# Patient Record
Sex: MALE | Race: WHITE | NOT HISPANIC OR LATINO | Employment: OTHER | URBAN - METROPOLITAN AREA
[De-identification: names, ages, dates, MRNs, and addresses within clinical notes are randomized per-mention and may not be internally consistent; named-entity substitution may affect disease eponyms.]

---

## 2017-01-07 RX ORDER — GABAPENTIN 100 MG/1
CAPSULE ORAL
Qty: 60 CAPSULE | Refills: 2 | Status: SHIPPED | OUTPATIENT
Start: 2017-01-07 | End: 2017-05-09 | Stop reason: SDUPTHER

## 2017-01-30 ENCOUNTER — LAB (OUTPATIENT)
Dept: PULMONOLOGY | Facility: CLINIC | Age: 72
End: 2017-01-30

## 2017-01-30 DIAGNOSIS — J47.9 BRONCHIECTASIS WITHOUT COMPLICATION (HCC): Primary | ICD-10-CM

## 2017-01-30 LAB
ALBUMIN SERPL-MCNC: 4 G/DL (ref 3.2–4.8)
ALBUMIN/GLOB SERPL: 1.2 G/DL (ref 1.5–2.5)
ALP SERPL-CCNC: 84 U/L (ref 25–100)
ALT SERPL W P-5'-P-CCNC: 17 U/L (ref 7–40)
ANION GAP SERPL CALCULATED.3IONS-SCNC: 6 MMOL/L (ref 3–11)
AST SERPL-CCNC: 16 U/L (ref 0–33)
BASOPHILS # BLD AUTO: 0.04 10*3/MM3 (ref 0–0.2)
BASOPHILS NFR BLD AUTO: 0.3 % (ref 0–1)
BILIRUB SERPL-MCNC: 0.4 MG/DL (ref 0.3–1.2)
BUN BLD-MCNC: 18 MG/DL (ref 9–23)
BUN/CREAT SERPL: 22.5 (ref 7–25)
CALCIUM SPEC-SCNC: 9.3 MG/DL (ref 8.7–10.4)
CHLORIDE SERPL-SCNC: 100 MMOL/L (ref 99–109)
CO2 SERPL-SCNC: 30 MMOL/L (ref 20–31)
CREAT BLD-MCNC: 0.8 MG/DL (ref 0.6–1.3)
DEPRECATED RDW RBC AUTO: 43.5 FL (ref 37–54)
EOSINOPHIL # BLD AUTO: 0.17 10*3/MM3 (ref 0.1–0.3)
EOSINOPHIL NFR BLD AUTO: 1.4 % (ref 0–3)
ERYTHROCYTE [DISTWIDTH] IN BLOOD BY AUTOMATED COUNT: 12.9 % (ref 11.3–14.5)
GFR SERPL CREATININE-BSD FRML MDRD: 95 ML/MIN/1.73
GLOBULIN UR ELPH-MCNC: 3.4 GM/DL
GLUCOSE BLD-MCNC: 87 MG/DL (ref 70–100)
HCT VFR BLD AUTO: 46.5 % (ref 38.9–50.9)
HGB BLD-MCNC: 15.5 G/DL (ref 13.1–17.5)
IMM GRANULOCYTES # BLD: 0.06 10*3/MM3 (ref 0–0.03)
IMM GRANULOCYTES NFR BLD: 0.5 % (ref 0–0.6)
LYMPHOCYTES # BLD AUTO: 3.31 10*3/MM3 (ref 0.6–4.8)
LYMPHOCYTES NFR BLD AUTO: 28 % (ref 24–44)
MCH RBC QN AUTO: 30.8 PG (ref 27–31)
MCHC RBC AUTO-ENTMCNC: 33.3 G/DL (ref 32–36)
MCV RBC AUTO: 92.3 FL (ref 80–99)
MONOCYTES # BLD AUTO: 0.9 10*3/MM3 (ref 0–1)
MONOCYTES NFR BLD AUTO: 7.6 % (ref 0–12)
NEUTROPHILS # BLD AUTO: 7.36 10*3/MM3 (ref 1.5–8.3)
NEUTROPHILS NFR BLD AUTO: 62.2 % (ref 41–71)
PLAT MORPH BLD: NORMAL
PLATELET # BLD AUTO: 230 10*3/MM3 (ref 150–450)
PMV BLD AUTO: 10.6 FL (ref 6–12)
POTASSIUM BLD-SCNC: 4.3 MMOL/L (ref 3.5–5.5)
PROT SERPL-MCNC: 7.4 G/DL (ref 5.7–8.2)
RBC # BLD AUTO: 5.04 10*6/MM3 (ref 4.2–5.76)
RBC MORPH BLD: NORMAL
SODIUM BLD-SCNC: 136 MMOL/L (ref 132–146)
WBC MORPH BLD: NORMAL
WBC NRBC COR # BLD: 11.84 10*3/MM3 (ref 3.5–10.8)

## 2017-01-30 PROCEDURE — 36415 COLL VENOUS BLD VENIPUNCTURE: CPT | Performed by: INTERNAL MEDICINE

## 2017-01-30 PROCEDURE — 80053 COMPREHEN METABOLIC PANEL: CPT | Performed by: INTERNAL MEDICINE

## 2017-01-30 PROCEDURE — 85025 COMPLETE CBC W/AUTO DIFF WBC: CPT | Performed by: INTERNAL MEDICINE

## 2017-01-30 PROCEDURE — 85007 BL SMEAR W/DIFF WBC COUNT: CPT | Performed by: INTERNAL MEDICINE

## 2017-02-02 RX ORDER — AZITHROMYCIN 500 MG/1
TABLET, FILM COATED ORAL
Qty: 12 TABLET | Refills: 4 | Status: SHIPPED | OUTPATIENT
Start: 2017-02-02 | End: 2017-06-27 | Stop reason: SDUPTHER

## 2017-02-02 RX ORDER — ETHAMBUTOL HYDROCHLORIDE 400 MG/1
TABLET, FILM COATED ORAL
Qty: 48 TABLET | Refills: 4 | Status: SHIPPED | OUTPATIENT
Start: 2017-02-02 | End: 2017-06-27 | Stop reason: SDUPTHER

## 2017-02-02 RX ORDER — RIFAMPIN 300 MG/1
CAPSULE ORAL
Qty: 24 CAPSULE | Refills: 4 | Status: SHIPPED | OUTPATIENT
Start: 2017-02-02 | End: 2017-06-27 | Stop reason: SDUPTHER

## 2017-02-28 ENCOUNTER — LAB (OUTPATIENT)
Dept: PULMONOLOGY | Facility: CLINIC | Age: 72
End: 2017-02-28

## 2017-02-28 DIAGNOSIS — R06.02 SOB (SHORTNESS OF BREATH): Primary | ICD-10-CM

## 2017-02-28 LAB
ALBUMIN SERPL-MCNC: 4 G/DL (ref 3.2–4.8)
ALBUMIN/GLOB SERPL: 1.3 G/DL (ref 1.5–2.5)
ALP SERPL-CCNC: 74 U/L (ref 25–100)
ALT SERPL W P-5'-P-CCNC: 22 U/L (ref 7–40)
ANION GAP SERPL CALCULATED.3IONS-SCNC: 3 MMOL/L (ref 3–11)
AST SERPL-CCNC: 20 U/L (ref 0–33)
BASOPHILS # BLD AUTO: 0.04 10*3/MM3 (ref 0–0.2)
BASOPHILS NFR BLD AUTO: 0.5 % (ref 0–1)
BILIRUB SERPL-MCNC: 0.4 MG/DL (ref 0.3–1.2)
BUN BLD-MCNC: 16 MG/DL (ref 9–23)
BUN/CREAT SERPL: 20 (ref 7–25)
CALCIUM SPEC-SCNC: 9.4 MG/DL (ref 8.7–10.4)
CHLORIDE SERPL-SCNC: 101 MMOL/L (ref 99–109)
CO2 SERPL-SCNC: 33 MMOL/L (ref 20–31)
CREAT BLD-MCNC: 0.8 MG/DL (ref 0.6–1.3)
DEPRECATED RDW RBC AUTO: 45.2 FL (ref 37–54)
EOSINOPHIL # BLD AUTO: 0.11 10*3/MM3 (ref 0.1–0.3)
EOSINOPHIL NFR BLD AUTO: 1.3 % (ref 0–3)
ERYTHROCYTE [DISTWIDTH] IN BLOOD BY AUTOMATED COUNT: 13.3 % (ref 11.3–14.5)
GFR SERPL CREATININE-BSD FRML MDRD: 95 ML/MIN/1.73
GLOBULIN UR ELPH-MCNC: 3 GM/DL
GLUCOSE BLD-MCNC: 85 MG/DL (ref 70–100)
HCT VFR BLD AUTO: 46.1 % (ref 38.9–50.9)
HGB BLD-MCNC: 15.2 G/DL (ref 13.1–17.5)
IMM GRANULOCYTES # BLD: 0.03 10*3/MM3 (ref 0–0.03)
IMM GRANULOCYTES NFR BLD: 0.4 % (ref 0–0.6)
LYMPHOCYTES # BLD AUTO: 2.85 10*3/MM3 (ref 0.6–4.8)
LYMPHOCYTES NFR BLD AUTO: 34 % (ref 24–44)
MCH RBC QN AUTO: 30.6 PG (ref 27–31)
MCHC RBC AUTO-ENTMCNC: 33 G/DL (ref 32–36)
MCV RBC AUTO: 92.8 FL (ref 80–99)
MONOCYTES # BLD AUTO: 0.76 10*3/MM3 (ref 0–1)
MONOCYTES NFR BLD AUTO: 9.1 % (ref 0–12)
NEUTROPHILS # BLD AUTO: 4.59 10*3/MM3 (ref 1.5–8.3)
NEUTROPHILS NFR BLD AUTO: 54.7 % (ref 41–71)
PLATELET # BLD AUTO: 304 10*3/MM3 (ref 150–450)
PMV BLD AUTO: 9.7 FL (ref 6–12)
POTASSIUM BLD-SCNC: 5 MMOL/L (ref 3.5–5.5)
PROT SERPL-MCNC: 7 G/DL (ref 5.7–8.2)
RBC # BLD AUTO: 4.97 10*6/MM3 (ref 4.2–5.76)
SODIUM BLD-SCNC: 137 MMOL/L (ref 132–146)
WBC NRBC COR # BLD: 8.38 10*3/MM3 (ref 3.5–10.8)

## 2017-02-28 PROCEDURE — 85025 COMPLETE CBC W/AUTO DIFF WBC: CPT | Performed by: INTERNAL MEDICINE

## 2017-02-28 PROCEDURE — 36415 COLL VENOUS BLD VENIPUNCTURE: CPT | Performed by: INTERNAL MEDICINE

## 2017-02-28 PROCEDURE — 80053 COMPREHEN METABOLIC PANEL: CPT | Performed by: INTERNAL MEDICINE

## 2017-03-28 ENCOUNTER — LAB (OUTPATIENT)
Dept: PULMONOLOGY | Facility: CLINIC | Age: 72
End: 2017-03-28

## 2017-03-28 DIAGNOSIS — R06.02 SOB (SHORTNESS OF BREATH): Primary | ICD-10-CM

## 2017-03-28 LAB
ALBUMIN SERPL-MCNC: 3.8 G/DL (ref 3.2–4.8)
ALBUMIN/GLOB SERPL: 1.2 G/DL (ref 1.5–2.5)
ALP SERPL-CCNC: 78 U/L (ref 25–100)
ALT SERPL W P-5'-P-CCNC: 21 U/L (ref 7–40)
ANION GAP SERPL CALCULATED.3IONS-SCNC: 4 MMOL/L (ref 3–11)
AST SERPL-CCNC: 19 U/L (ref 0–33)
BASOPHILS # BLD AUTO: 0.05 10*3/MM3 (ref 0–0.2)
BASOPHILS NFR BLD AUTO: 0.4 % (ref 0–1)
BILIRUB SERPL-MCNC: 0.4 MG/DL (ref 0.3–1.2)
BUN BLD-MCNC: 16 MG/DL (ref 9–23)
BUN/CREAT SERPL: 20 (ref 7–25)
CALCIUM SPEC-SCNC: 9.1 MG/DL (ref 8.7–10.4)
CHLORIDE SERPL-SCNC: 107 MMOL/L (ref 99–109)
CO2 SERPL-SCNC: 31 MMOL/L (ref 20–31)
CREAT BLD-MCNC: 0.8 MG/DL (ref 0.6–1.3)
DEPRECATED RDW RBC AUTO: 46 FL (ref 37–54)
EOSINOPHIL # BLD AUTO: 0.18 10*3/MM3 (ref 0.1–0.3)
EOSINOPHIL NFR BLD AUTO: 1.5 % (ref 0–3)
ERYTHROCYTE [DISTWIDTH] IN BLOOD BY AUTOMATED COUNT: 13.4 % (ref 11.3–14.5)
GFR SERPL CREATININE-BSD FRML MDRD: 95 ML/MIN/1.73
GLOBULIN UR ELPH-MCNC: 3.1 GM/DL
GLUCOSE BLD-MCNC: 86 MG/DL (ref 70–100)
HCT VFR BLD AUTO: 45.4 % (ref 38.9–50.9)
HGB BLD-MCNC: 15.1 G/DL (ref 13.1–17.5)
IMM GRANULOCYTES # BLD: 0.04 10*3/MM3 (ref 0–0.03)
IMM GRANULOCYTES NFR BLD: 0.3 % (ref 0–0.6)
LYMPHOCYTES # BLD AUTO: 3.05 10*3/MM3 (ref 0.6–4.8)
LYMPHOCYTES NFR BLD AUTO: 25.1 % (ref 24–44)
MCH RBC QN AUTO: 31.1 PG (ref 27–31)
MCHC RBC AUTO-ENTMCNC: 33.3 G/DL (ref 32–36)
MCV RBC AUTO: 93.6 FL (ref 80–99)
MONOCYTES # BLD AUTO: 1.03 10*3/MM3 (ref 0–1)
MONOCYTES NFR BLD AUTO: 8.5 % (ref 0–12)
NEUTROPHILS # BLD AUTO: 7.81 10*3/MM3 (ref 1.5–8.3)
NEUTROPHILS NFR BLD AUTO: 64.2 % (ref 41–71)
PLATELET # BLD AUTO: 277 10*3/MM3 (ref 150–450)
PMV BLD AUTO: 9.6 FL (ref 6–12)
POTASSIUM BLD-SCNC: 4.6 MMOL/L (ref 3.5–5.5)
PROT SERPL-MCNC: 6.9 G/DL (ref 5.7–8.2)
RBC # BLD AUTO: 4.85 10*6/MM3 (ref 4.2–5.76)
SODIUM BLD-SCNC: 142 MMOL/L (ref 132–146)
WBC NRBC COR # BLD: 12.16 10*3/MM3 (ref 3.5–10.8)

## 2017-03-28 PROCEDURE — 36415 COLL VENOUS BLD VENIPUNCTURE: CPT | Performed by: INTERNAL MEDICINE

## 2017-03-28 PROCEDURE — 80053 COMPREHEN METABOLIC PANEL: CPT | Performed by: INTERNAL MEDICINE

## 2017-03-28 PROCEDURE — 85025 COMPLETE CBC W/AUTO DIFF WBC: CPT | Performed by: INTERNAL MEDICINE

## 2017-04-25 ENCOUNTER — OFFICE VISIT (OUTPATIENT)
Dept: PULMONOLOGY | Facility: CLINIC | Age: 72
End: 2017-04-25

## 2017-04-25 VITALS
WEIGHT: 167 LBS | RESPIRATION RATE: 16 BRPM | DIASTOLIC BLOOD PRESSURE: 68 MMHG | HEART RATE: 68 BPM | HEIGHT: 70 IN | OXYGEN SATURATION: 90 % | SYSTOLIC BLOOD PRESSURE: 120 MMHG | TEMPERATURE: 96.9 F | BODY MASS INDEX: 23.91 KG/M2

## 2017-04-25 DIAGNOSIS — A31.0 MYCOBACTERIUM AVIUM COMPLEX (HCC): Primary | ICD-10-CM

## 2017-04-25 DIAGNOSIS — J47.9 BRONCHIECTASIS WITHOUT COMPLICATION (HCC): Chronic | ICD-10-CM

## 2017-04-25 DIAGNOSIS — K21.9 GASTROESOPHAGEAL REFLUX DISEASE WITHOUT ESOPHAGITIS: Chronic | ICD-10-CM

## 2017-04-25 DIAGNOSIS — J43.2 CENTRILOBULAR EMPHYSEMA (HCC): ICD-10-CM

## 2017-04-25 DIAGNOSIS — R06.02 SOB (SHORTNESS OF BREATH): ICD-10-CM

## 2017-04-25 LAB
ALBUMIN SERPL-MCNC: 3.8 G/DL (ref 3.2–4.8)
ALBUMIN/GLOB SERPL: 1.2 G/DL (ref 1.5–2.5)
ALP SERPL-CCNC: 74 U/L (ref 25–100)
ALT SERPL W P-5'-P-CCNC: 22 U/L (ref 7–40)
ANION GAP SERPL CALCULATED.3IONS-SCNC: 5 MMOL/L (ref 3–11)
AST SERPL-CCNC: 27 U/L (ref 0–33)
BASOPHILS # BLD AUTO: 0.04 10*3/MM3 (ref 0–0.2)
BASOPHILS NFR BLD AUTO: 0.3 % (ref 0–1)
BILIRUB SERPL-MCNC: 0.2 MG/DL (ref 0.3–1.2)
BUN BLD-MCNC: 20 MG/DL (ref 9–23)
BUN/CREAT SERPL: 25 (ref 7–25)
CALCIUM SPEC-SCNC: 9.2 MG/DL (ref 8.7–10.4)
CHLORIDE SERPL-SCNC: 104 MMOL/L (ref 99–109)
CO2 SERPL-SCNC: 31 MMOL/L (ref 20–31)
CREAT BLD-MCNC: 0.8 MG/DL (ref 0.6–1.3)
DEPRECATED RDW RBC AUTO: 43.9 FL (ref 37–54)
EOSINOPHIL # BLD AUTO: 0.11 10*3/MM3 (ref 0.1–0.3)
EOSINOPHIL NFR BLD AUTO: 0.9 % (ref 0–3)
ERYTHROCYTE [DISTWIDTH] IN BLOOD BY AUTOMATED COUNT: 13.1 % (ref 11.3–14.5)
GFR SERPL CREATININE-BSD FRML MDRD: 95 ML/MIN/1.73
GLOBULIN UR ELPH-MCNC: 3.1 GM/DL
GLUCOSE BLD-MCNC: 126 MG/DL (ref 70–100)
HCT VFR BLD AUTO: 43.4 % (ref 38.9–50.9)
HGB BLD-MCNC: 14.4 G/DL (ref 13.1–17.5)
IMM GRANULOCYTES # BLD: 0.02 10*3/MM3 (ref 0–0.03)
IMM GRANULOCYTES NFR BLD: 0.2 % (ref 0–0.6)
LYMPHOCYTES # BLD AUTO: 2.46 10*3/MM3 (ref 0.6–4.8)
LYMPHOCYTES NFR BLD AUTO: 21.1 % (ref 24–44)
MCH RBC QN AUTO: 30.4 PG (ref 27–31)
MCHC RBC AUTO-ENTMCNC: 33.2 G/DL (ref 32–36)
MCV RBC AUTO: 91.6 FL (ref 80–99)
MONOCYTES # BLD AUTO: 0.7 10*3/MM3 (ref 0–1)
MONOCYTES NFR BLD AUTO: 6 % (ref 0–12)
NEUTROPHILS # BLD AUTO: 8.31 10*3/MM3 (ref 1.5–8.3)
NEUTROPHILS NFR BLD AUTO: 71.5 % (ref 41–71)
PLATELET # BLD AUTO: 292 10*3/MM3 (ref 150–450)
PMV BLD AUTO: 10 FL (ref 6–12)
POTASSIUM BLD-SCNC: 3.8 MMOL/L (ref 3.5–5.5)
PROT SERPL-MCNC: 6.9 G/DL (ref 5.7–8.2)
RBC # BLD AUTO: 4.74 10*6/MM3 (ref 4.2–5.76)
SODIUM BLD-SCNC: 140 MMOL/L (ref 132–146)
WBC NRBC COR # BLD: 11.64 10*3/MM3 (ref 3.5–10.8)

## 2017-04-25 PROCEDURE — 36415 COLL VENOUS BLD VENIPUNCTURE: CPT | Performed by: INTERNAL MEDICINE

## 2017-04-25 PROCEDURE — 99213 OFFICE O/P EST LOW 20 MIN: CPT | Performed by: INTERNAL MEDICINE

## 2017-04-25 PROCEDURE — 85025 COMPLETE CBC W/AUTO DIFF WBC: CPT | Performed by: INTERNAL MEDICINE

## 2017-04-25 PROCEDURE — 80053 COMPREHEN METABOLIC PANEL: CPT | Performed by: INTERNAL MEDICINE

## 2017-04-25 NOTE — PATIENT INSTRUCTIONS
You appear be doing well today and have 5 more months of therapy for MAC  Plan:  1. I will see you in 4 months and we will obtain a CT scan of the chest at that time  2. Continue to get labs drawn monthly  3. We need to remember to get a flu vaccine this fall  4. If once a day Breo is as effective as Advair call us for a prescription. I gave you enough for 4 weeks

## 2017-04-25 NOTE — PROGRESS NOTES
Subjective   Dave Hinds is a 71 y.o.  white male whom I followed for quite some time. The patient was last seen in the office 12/8/16 at which time I outlined his problems as follows:   1. Bronchiectasis.  a. Worse in the upper lobes, left greater than right.  b. Previous growth of staph aureus and 2 species of pseudomonas.   c. MAC growth 01/21/2014 not initially treated, but therapy started when chest x-ray worsened and MAC cultured out again 02/25/2016.  d.Therapy started 3/2016 with Zithromax, ethambutol, rifampin on Monday, Wednesday, Friday.   e. Had previously been treated with rotating courses of antibiotics Augmentin and Cipro the first week of each month as well as inhaled ellis, which was very effective when grew PSA.   2. GERD.  a. History of dyspepsia.   3. History of hypertension.   4. Remote history of pneumonia and “lung cavities”.   5. History of T&A.  6. PENICILLIN allergy and yet can take Augmentin.      History of Present Illness   Since our last visit 12/8/16 (4-1/2 months ago) Mr. Hinds has had no major problems. He continues to take his therapy religiously and has not had any major infections requiring additional antibiotics. 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 He has not however had grossly purulent sputum, hemoptysis, pleuritic pain, fever, chills, sweats, anorexia, weight loss. He continues to take his MAC therapy 3 times a week and is having no side effects. Labs are obtained once a month and when last obtained 3/28/17, labs were unremarkable. He continues to use Advair but states that he will need to switch to Breo because of insurance requirements. He has both an albuterol nebulizer and albuterol metered-dose inhaler to take as needed.     The following portions of the patient's history were reviewed and updated as appropriate: allergies, current medications, past family  history, past medical history, past social history, past surgical history and problem list.    Review of Systems   Constitutional: Negative for appetite change, chills, diaphoresis, fatigue, fever and unexpected weight change.   HENT: Negative for congestion, ear discharge, ear pain, hearing loss, postnasal drip, rhinorrhea, sinus pressure, sneezing, sore throat, trouble swallowing and voice change.    Eyes: Negative for visual disturbance.        No diplopia   Respiratory: Positive for cough (Intermittent but usually productive ). Negative for choking, chest tightness, shortness of breath, wheezing and stridor.    Cardiovascular: Negative for chest pain, palpitations and leg swelling.   Gastrointestinal: Negative for abdominal pain, anal bleeding, blood in stool, constipation, diarrhea, nausea and vomiting.   Endocrine: Negative for cold intolerance, heat intolerance, polydipsia and polyuria.   Genitourinary: Negative for decreased urine volume, difficulty urinating, dysuria, frequency, hematuria and urgency.   Musculoskeletal: Negative for arthralgias, back pain, joint swelling and myalgias.   Skin: Negative for pallor and rash.   Allergic/Immunologic: Negative for environmental allergies, food allergies and immunocompromised state.   Neurological: Positive for dizziness (still occurs when he stands up quickly and turns his head). Negative for seizures, syncope, speech difficulty, weakness and headaches.   Hematological: Negative for adenopathy. Does not bruise/bleed easily.   Psychiatric/Behavioral: Negative for confusion, decreased concentration and sleep disturbance. The patient is not nervous/anxious.    All other systems reviewed and are negative.      Prior to Admission medications    Medication Sig Start Date End Date Taking? Authorizing Provider   albuterol (PROVENTIL HFA;VENTOLIN HFA) 108 (90 BASE) MCG/ACT inhaler ProAir  (90 Base) MCG/ACT Inhalation Aerosol Solution; Patient Sig: ProAir   "(90 Base) MCG/ACT Inhalation Aerosol Solution INHALE 2 PUFFS FOUR TIMES A DAY FOR COUGH; 1; 11; 02-Jun-2015; Active 6/2/15  Yes Dave Peter MD   albuterol (PROVENTIL) (2.5 MG/3ML) 0.083% nebulizer solution Take 2.5 mg by nebulization every 4 (four) hours as needed for wheezing. 1/25/16  Yes Antonio Provider, MD   azithromycin (ZITHROMAX) 500 MG tablet TAKE ONE TABLET BY MOUTH DAILY ON MONDAY, WEDNESDAY AND FRIDAY 2/2/17  Yes Dave Peter MD   diltiazem (TIAZAC) 300 MG 24 hr capsule Take 1 capsule by mouth every morning. 2/29/16  Yes Sachin Garcia MD   ethambutol (MYAMBUTOL) 400 MG tablet TAKE 4 TABLETS BY MOUTH EVERY MONDAY, WEDNESDAY, AND  FRIDAY. 2/2/17  Yes Dave Peter MD   fluticasone-salmeterol (ADVAIR) 250-50 MCG/DOSE DISKUS 2 (two) times a day. Inhale 1 puff by mouth twice daily. 1/21/14  Yes aDve Peter MD   gabapentin (NEURONTIN) 100 MG capsule TAKE 1 TO 2 CAPSULES BY MOUTH AT BEDTIME  Patient taking differently: 2 CAPSULES BY MOUTH AT BEDTIME 1/7/17  Yes Sachin Garcia MD   hydrochlorothiazide (MICROZIDE) 12.5 MG capsule Take 1 capsule by mouth Every Morning. 10/10/16  Yes Sachin Garcia MD   omeprazole (PriLOSEC) 40 MG capsule TAKE ONE CAPSULE BY MOUTH EVERY DAY 7/21/16  Yes Dave Peter MD   rifAMPin (RIFADIN) 300 MG capsule TAKE 2 CAPSULES BY MOUTH EVERY MONDAY, WEDNESDAY, AND  FRIDAY. 2/2/17  Yes Dave Peter MD       Objective   Blood pressure 120/68, pulse 68, temperature 96.9 °F (36.1 °C), resp. rate 16, height 70\" (177.8 cm), weight 167 lb (75.8 kg), SpO2 90 %.    Flowsheet Rows         First Filed Value    Admission Height  70\" (177.8 cm) Documented at 04/25/2017 1304    Admission Weight  167 lb (75.8 kg) Documented at 04/25/2017 1304        Physical Exam   Constitutional: He is oriented to person, place, and time. He appears well-developed and well-nourished.   Well-developed thin white male in no acute distress   HENT:   Head: " Normocephalic and atraumatic.   Right Ear: Hearing and external ear normal.   Left Ear: Hearing and external ear normal.   Nose: Nose normal.   Mouth/Throat: Oropharynx is clear and moist. No oropharyngeal exudate.   Eyes: Conjunctivae and EOM are normal. Pupils are equal, round, and reactive to light. Right eye exhibits no discharge. Left eye exhibits no discharge. No scleral icterus.   Neck: Normal range of motion. Neck supple. No JVD present. No tracheal deviation present. No thyromegaly present.   Cardiovascular: Normal rate, regular rhythm, normal heart sounds and intact distal pulses.  Exam reveals no gallop and no friction rub.    No murmur heard.  Pulmonary/Chest: Effort normal. No accessory muscle usage or stridor. No apnea, no tachypnea and no bradypnea. No respiratory distress. He has no wheezes. He has no rhonchi. He has rales. He exhibits no tenderness.   A few fibrotic crackles in the bases as well as the left upper lobe anteriorly.   Abdominal: Soft. Bowel sounds are normal. He exhibits no distension and no mass. There is no splenomegaly or hepatomegaly. There is no tenderness. There is no rebound and no guarding. No hernia.   Musculoskeletal: Normal range of motion. He exhibits no edema, tenderness or deformity.   Lymphadenopathy:        Head (right side): No submandibular adenopathy present.        Head (left side): No submandibular adenopathy present.     He has no cervical adenopathy.        Right: No supraclavicular and no epitrochlear adenopathy present.        Left: No supraclavicular and no epitrochlear adenopathy present.   Neurological: He is alert and oriented to person, place, and time. He has normal reflexes. He displays normal reflexes. No cranial nerve deficit. He exhibits normal muscle tone. Coordination normal.   Skin: Skin is warm and dry. No bruising, no ecchymosis, no petechiae and no rash noted. He is not diaphoretic. No cyanosis or erythema. No pallor. Nails show no clubbing.    Psychiatric: He has a normal mood and affect. His speech is normal and behavior is normal. Judgment and thought content normal.   Nursing note and vitals reviewed.      Assessment/Plan   1. Bronchiectasis.  a. Worse in the upper lobes, left greater than right.  b. Previous growth of staph aureus and 2 species of pseudomonas.   c. MAC growth 01/21/2014 not initially treated, but therapy started when chest x-ray worsened and MAC cultured out again 02/25/2016.  d.Therapy started 3/2016 with Zithromax, ethambutol, rifampin on Monday, Wednesday, Friday.   e. Had previously been treated with rotating courses of antibiotics Augmentin and Cipro the first week of each month as well as inhaled ellis, which was very effective when grew PSA.   2. GERD.  a. History of dyspepsia.   3. History of hypertension.   4. Remote history of pneumonia and “lung cavities”.   5. History of T&A.  6. PENICILLIN allergy and yet can take Augmentin.      Discussion: Does not appear to be having any new problems, is tolerating MAC therapy without any significant abnormalities in the follow-up labs, and remains very active without difficulty.    Plan:   1. He will complete MAC therapy in 5 months  2. Continue monthly labs  3. Needs a flu vaccine this fall  4. I have given him one month of samples of Breo. If he feels it is as effective as Advair he will call us for prescription. If he feels it is ineffective or he has any side effects, I will write a letter to his insurance company so that he continue to get Advair  5. I will see him in 4 months and at that time we will obtain PFTs and a CT scan of the chest. (The latter as a baseline prior to stopping MAC therapy)  6. After he stops therapy, if he has symptoms suggestive of recurrence would recommend bronchoscopy and lavage.    Dave Peter MD  Pulmonary and Critical Care Medicine  04/25/17 4:00 PM

## 2017-05-10 RX ORDER — GABAPENTIN 100 MG/1
CAPSULE ORAL
Qty: 60 CAPSULE | Refills: 1 | Status: SHIPPED | OUTPATIENT
Start: 2017-05-10 | End: 2017-08-18 | Stop reason: SDUPTHER

## 2017-05-25 DIAGNOSIS — A31.0 MYCOBACTERIUM AVIUM COMPLEX (HCC): ICD-10-CM

## 2017-05-25 DIAGNOSIS — J47.9 BRONCHIECTASIS WITHOUT COMPLICATION (HCC): Primary | ICD-10-CM

## 2017-05-25 LAB
ALBUMIN SERPL-MCNC: 3.8 G/DL (ref 3.2–4.8)
ALBUMIN/GLOB SERPL: 1.3 G/DL (ref 1.5–2.5)
ALP SERPL-CCNC: 81 U/L (ref 25–100)
ALT SERPL W P-5'-P-CCNC: 19 U/L (ref 7–40)
ANION GAP SERPL CALCULATED.3IONS-SCNC: 6 MMOL/L (ref 3–11)
AST SERPL-CCNC: 16 U/L (ref 0–33)
BASOPHILS # BLD AUTO: 0.03 10*3/MM3 (ref 0–0.2)
BASOPHILS NFR BLD AUTO: 0.3 % (ref 0–1)
BILIRUB SERPL-MCNC: 0.4 MG/DL (ref 0.3–1.2)
BUN BLD-MCNC: 18 MG/DL (ref 9–23)
BUN/CREAT SERPL: 22.5 (ref 7–25)
CALCIUM SPEC-SCNC: 9.2 MG/DL (ref 8.7–10.4)
CHLORIDE SERPL-SCNC: 104 MMOL/L (ref 99–109)
CO2 SERPL-SCNC: 29 MMOL/L (ref 20–31)
CREAT BLD-MCNC: 0.8 MG/DL (ref 0.6–1.3)
DEPRECATED RDW RBC AUTO: 45.1 FL (ref 37–54)
EOSINOPHIL # BLD AUTO: 0.09 10*3/MM3 (ref 0.1–0.3)
EOSINOPHIL NFR BLD AUTO: 0.8 % (ref 0–3)
ERYTHROCYTE [DISTWIDTH] IN BLOOD BY AUTOMATED COUNT: 13.2 % (ref 11.3–14.5)
GFR SERPL CREATININE-BSD FRML MDRD: 95 ML/MIN/1.73
GLOBULIN UR ELPH-MCNC: 3 GM/DL
GLUCOSE BLD-MCNC: 170 MG/DL (ref 70–100)
HCT VFR BLD AUTO: 45.4 % (ref 38.9–50.9)
HGB BLD-MCNC: 14.7 G/DL (ref 13.1–17.5)
IMM GRANULOCYTES # BLD: 0.03 10*3/MM3 (ref 0–0.03)
IMM GRANULOCYTES NFR BLD: 0.3 % (ref 0–0.6)
LYMPHOCYTES # BLD AUTO: 2.16 10*3/MM3 (ref 0.6–4.8)
LYMPHOCYTES NFR BLD AUTO: 19.1 % (ref 24–44)
MCH RBC QN AUTO: 30.2 PG (ref 27–31)
MCHC RBC AUTO-ENTMCNC: 32.4 G/DL (ref 32–36)
MCV RBC AUTO: 93.4 FL (ref 80–99)
MONOCYTES # BLD AUTO: 0.69 10*3/MM3 (ref 0–1)
MONOCYTES NFR BLD AUTO: 6.1 % (ref 0–12)
NEUTROPHILS # BLD AUTO: 8.28 10*3/MM3 (ref 1.5–8.3)
NEUTROPHILS NFR BLD AUTO: 73.4 % (ref 41–71)
PLATELET # BLD AUTO: 295 10*3/MM3 (ref 150–450)
PMV BLD AUTO: 9.8 FL (ref 6–12)
POTASSIUM BLD-SCNC: 4.3 MMOL/L (ref 3.5–5.5)
PROT SERPL-MCNC: 6.8 G/DL (ref 5.7–8.2)
RBC # BLD AUTO: 4.86 10*6/MM3 (ref 4.2–5.76)
SODIUM BLD-SCNC: 139 MMOL/L (ref 132–146)
WBC NRBC COR # BLD: 11.28 10*3/MM3 (ref 3.5–10.8)

## 2017-05-25 PROCEDURE — 85025 COMPLETE CBC W/AUTO DIFF WBC: CPT | Performed by: INTERNAL MEDICINE

## 2017-05-25 PROCEDURE — 80053 COMPREHEN METABOLIC PANEL: CPT | Performed by: INTERNAL MEDICINE

## 2017-06-06 DIAGNOSIS — I10 ESSENTIAL HYPERTENSION: ICD-10-CM

## 2017-06-06 RX ORDER — HYDROCHLOROTHIAZIDE 12.5 MG/1
CAPSULE, GELATIN COATED ORAL
Qty: 90 CAPSULE | Refills: 1 | Status: SHIPPED | OUTPATIENT
Start: 2017-06-06 | End: 2018-01-06 | Stop reason: SDUPTHER

## 2017-06-22 ENCOUNTER — LAB (OUTPATIENT)
Dept: PULMONOLOGY | Facility: CLINIC | Age: 72
End: 2017-06-22

## 2017-06-22 DIAGNOSIS — J47.9 BRONCHIECTASIS WITHOUT COMPLICATION (HCC): Primary | ICD-10-CM

## 2017-06-22 LAB
ALBUMIN SERPL-MCNC: 3.9 G/DL (ref 3.2–4.8)
ALBUMIN/GLOB SERPL: 1.2 G/DL (ref 1.5–2.5)
ALP SERPL-CCNC: 89 U/L (ref 25–100)
ALT SERPL W P-5'-P-CCNC: 19 U/L (ref 7–40)
ANION GAP SERPL CALCULATED.3IONS-SCNC: 10 MMOL/L (ref 3–11)
AST SERPL-CCNC: 21 U/L (ref 0–33)
BASOPHILS # BLD AUTO: 0.06 10*3/MM3 (ref 0–0.2)
BASOPHILS NFR BLD AUTO: 0.6 % (ref 0–1)
BILIRUB SERPL-MCNC: 0.4 MG/DL (ref 0.3–1.2)
BUN BLD-MCNC: 20 MG/DL (ref 9–23)
BUN/CREAT SERPL: 22.2 (ref 7–25)
CALCIUM SPEC-SCNC: 9.3 MG/DL (ref 8.7–10.4)
CHLORIDE SERPL-SCNC: 104 MMOL/L (ref 99–109)
CO2 SERPL-SCNC: 27 MMOL/L (ref 20–31)
CREAT BLD-MCNC: 0.9 MG/DL (ref 0.6–1.3)
DEPRECATED RDW RBC AUTO: 44.5 FL (ref 37–54)
EOSINOPHIL # BLD AUTO: 0.42 10*3/MM3 (ref 0.1–0.3)
EOSINOPHIL NFR BLD AUTO: 4.4 % (ref 0–3)
ERYTHROCYTE [DISTWIDTH] IN BLOOD BY AUTOMATED COUNT: 13.1 % (ref 11.3–14.5)
GFR SERPL CREATININE-BSD FRML MDRD: 83 ML/MIN/1.73
GLOBULIN UR ELPH-MCNC: 3.3 GM/DL
GLUCOSE BLD-MCNC: 145 MG/DL (ref 70–100)
HCT VFR BLD AUTO: 43.4 % (ref 38.9–50.9)
HGB BLD-MCNC: 14.1 G/DL (ref 13.1–17.5)
IMM GRANULOCYTES # BLD: 0.03 10*3/MM3 (ref 0–0.03)
IMM GRANULOCYTES NFR BLD: 0.3 % (ref 0–0.6)
LYMPHOCYTES # BLD AUTO: 2.35 10*3/MM3 (ref 0.6–4.8)
LYMPHOCYTES NFR BLD AUTO: 24.8 % (ref 24–44)
MCH RBC QN AUTO: 30.2 PG (ref 27–31)
MCHC RBC AUTO-ENTMCNC: 32.5 G/DL (ref 32–36)
MCV RBC AUTO: 92.9 FL (ref 80–99)
MONOCYTES # BLD AUTO: 0.7 10*3/MM3 (ref 0–1)
MONOCYTES NFR BLD AUTO: 7.4 % (ref 0–12)
NEUTROPHILS # BLD AUTO: 5.93 10*3/MM3 (ref 1.5–8.3)
NEUTROPHILS NFR BLD AUTO: 62.5 % (ref 41–71)
PLATELET # BLD AUTO: 320 10*3/MM3 (ref 150–450)
PMV BLD AUTO: 9.8 FL (ref 6–12)
POTASSIUM BLD-SCNC: 4.4 MMOL/L (ref 3.5–5.5)
PROT SERPL-MCNC: 7.2 G/DL (ref 5.7–8.2)
RBC # BLD AUTO: 4.67 10*6/MM3 (ref 4.2–5.76)
SODIUM BLD-SCNC: 141 MMOL/L (ref 132–146)
WBC NRBC COR # BLD: 9.49 10*3/MM3 (ref 3.5–10.8)

## 2017-06-22 PROCEDURE — 80053 COMPREHEN METABOLIC PANEL: CPT | Performed by: INTERNAL MEDICINE

## 2017-06-22 PROCEDURE — 36415 COLL VENOUS BLD VENIPUNCTURE: CPT | Performed by: INTERNAL MEDICINE

## 2017-06-22 PROCEDURE — 85025 COMPLETE CBC W/AUTO DIFF WBC: CPT | Performed by: INTERNAL MEDICINE

## 2017-06-22 RX ORDER — OMEPRAZOLE 40 MG/1
40 CAPSULE, DELAYED RELEASE ORAL DAILY
Qty: 30 CAPSULE | Refills: 10 | Status: SHIPPED | OUTPATIENT
Start: 2017-06-22 | End: 2018-07-17 | Stop reason: SDUPTHER

## 2017-06-26 RX ORDER — DILTIAZEM HYDROCHLORIDE 300 MG/1
CAPSULE, COATED, EXTENDED RELEASE ORAL
Qty: 90 CAPSULE | Refills: 1 | OUTPATIENT
Start: 2017-06-26

## 2017-06-27 RX ORDER — RIFAMPIN 300 MG/1
CAPSULE ORAL
Qty: 24 CAPSULE | Refills: 3 | Status: SHIPPED | OUTPATIENT
Start: 2017-06-27 | End: 2017-10-06 | Stop reason: ALTCHOICE

## 2017-06-27 RX ORDER — ETHAMBUTOL HYDROCHLORIDE 400 MG/1
TABLET, FILM COATED ORAL
Qty: 48 TABLET | Refills: 3 | Status: SHIPPED | OUTPATIENT
Start: 2017-06-27 | End: 2017-10-06 | Stop reason: ALTCHOICE

## 2017-06-27 RX ORDER — AZITHROMYCIN 500 MG/1
TABLET, FILM COATED ORAL
Qty: 12 TABLET | Refills: 3 | Status: SHIPPED | OUTPATIENT
Start: 2017-06-27 | End: 2017-11-09 | Stop reason: SDUPTHER

## 2017-07-03 RX ORDER — GABAPENTIN 100 MG/1
CAPSULE ORAL
Qty: 60 CAPSULE | Refills: 0 | OUTPATIENT
Start: 2017-07-03

## 2017-07-24 RX ORDER — DILTIAZEM HYDROCHLORIDE 300 MG/1
CAPSULE, COATED, EXTENDED RELEASE ORAL
Qty: 90 CAPSULE | Refills: 1 | Status: SHIPPED | OUTPATIENT
Start: 2017-07-24 | End: 2017-08-18

## 2017-07-25 ENCOUNTER — LAB (OUTPATIENT)
Dept: PULMONOLOGY | Facility: CLINIC | Age: 72
End: 2017-07-25

## 2017-07-25 DIAGNOSIS — R06.02 SOB (SHORTNESS OF BREATH): Primary | ICD-10-CM

## 2017-07-25 LAB
ALBUMIN SERPL-MCNC: 3.9 G/DL (ref 3.2–4.8)
ALBUMIN/GLOB SERPL: 1.2 G/DL (ref 1.5–2.5)
ALP SERPL-CCNC: 94 U/L (ref 25–100)
ALT SERPL W P-5'-P-CCNC: 18 U/L (ref 7–40)
ANION GAP SERPL CALCULATED.3IONS-SCNC: 8 MMOL/L (ref 3–11)
AST SERPL-CCNC: 17 U/L (ref 0–33)
BASOPHILS # BLD AUTO: 0.05 10*3/MM3 (ref 0–0.2)
BASOPHILS NFR BLD AUTO: 0.4 % (ref 0–1)
BILIRUB SERPL-MCNC: 0.4 MG/DL (ref 0.3–1.2)
BUN BLD-MCNC: 17 MG/DL (ref 9–23)
BUN/CREAT SERPL: 21.3 (ref 7–25)
CALCIUM SPEC-SCNC: 9.1 MG/DL (ref 8.7–10.4)
CHLORIDE SERPL-SCNC: 101 MMOL/L (ref 99–109)
CO2 SERPL-SCNC: 28 MMOL/L (ref 20–31)
CREAT BLD-MCNC: 0.8 MG/DL (ref 0.6–1.3)
DEPRECATED RDW RBC AUTO: 44.9 FL (ref 37–54)
EOSINOPHIL # BLD AUTO: 0.59 10*3/MM3 (ref 0–0.3)
EOSINOPHIL NFR BLD AUTO: 4.7 % (ref 0–3)
ERYTHROCYTE [DISTWIDTH] IN BLOOD BY AUTOMATED COUNT: 13 % (ref 11.3–14.5)
GFR SERPL CREATININE-BSD FRML MDRD: 95 ML/MIN/1.73
GLOBULIN UR ELPH-MCNC: 3.3 GM/DL
GLUCOSE BLD-MCNC: 119 MG/DL (ref 70–100)
HCT VFR BLD AUTO: 45.5 % (ref 38.9–50.9)
HGB BLD-MCNC: 14.7 G/DL (ref 13.1–17.5)
IMM GRANULOCYTES # BLD: 0.03 10*3/MM3 (ref 0–0.03)
IMM GRANULOCYTES NFR BLD: 0.2 % (ref 0–0.6)
LYMPHOCYTES # BLD AUTO: 2.39 10*3/MM3 (ref 0.6–4.8)
LYMPHOCYTES NFR BLD AUTO: 19.2 % (ref 24–44)
MCH RBC QN AUTO: 30.5 PG (ref 27–31)
MCHC RBC AUTO-ENTMCNC: 32.3 G/DL (ref 32–36)
MCV RBC AUTO: 94.4 FL (ref 80–99)
MONOCYTES # BLD AUTO: 1.09 10*3/MM3 (ref 0–1)
MONOCYTES NFR BLD AUTO: 8.7 % (ref 0–12)
NEUTROPHILS # BLD AUTO: 8.33 10*3/MM3 (ref 1.5–8.3)
NEUTROPHILS NFR BLD AUTO: 66.8 % (ref 41–71)
PLATELET # BLD AUTO: 312 10*3/MM3 (ref 150–450)
PMV BLD AUTO: 9.7 FL (ref 6–12)
POTASSIUM BLD-SCNC: 4.2 MMOL/L (ref 3.5–5.5)
PROT SERPL-MCNC: 7.2 G/DL (ref 5.7–8.2)
RBC # BLD AUTO: 4.82 10*6/MM3 (ref 4.2–5.76)
SODIUM BLD-SCNC: 137 MMOL/L (ref 132–146)
WBC NRBC COR # BLD: 12.48 10*3/MM3 (ref 3.5–10.8)

## 2017-07-25 PROCEDURE — 85025 COMPLETE CBC W/AUTO DIFF WBC: CPT | Performed by: INTERNAL MEDICINE

## 2017-07-25 PROCEDURE — 80053 COMPREHEN METABOLIC PANEL: CPT | Performed by: INTERNAL MEDICINE

## 2017-07-25 PROCEDURE — 36415 COLL VENOUS BLD VENIPUNCTURE: CPT | Performed by: INTERNAL MEDICINE

## 2017-07-26 RX ORDER — ALBUTEROL SULFATE 2.5 MG/3ML
SOLUTION RESPIRATORY (INHALATION)
Qty: 375 ML | Refills: 5 | Status: SHIPPED | OUTPATIENT
Start: 2017-07-26 | End: 2017-07-28 | Stop reason: SDUPTHER

## 2017-07-28 DIAGNOSIS — J44.9 CHRONIC OBSTRUCTIVE PULMONARY DISEASE, UNSPECIFIED COPD TYPE (HCC): Primary | ICD-10-CM

## 2017-07-28 RX ORDER — ALBUTEROL SULFATE 2.5 MG/3ML
2.5 SOLUTION RESPIRATORY (INHALATION) EVERY 4 HOURS PRN
Qty: 375 ML | Refills: 5 | Status: SHIPPED | OUTPATIENT
Start: 2017-07-28 | End: 2017-12-07 | Stop reason: SDUPTHER

## 2017-08-04 ENCOUNTER — HOSPITAL ENCOUNTER (OUTPATIENT)
Dept: CT IMAGING | Facility: HOSPITAL | Age: 72
Discharge: HOME | End: 2017-08-04
Attending: INTERNAL MEDICINE | Admitting: INTERNAL MEDICINE

## 2017-08-04 DIAGNOSIS — A31.0 MYCOBACTERIUM AVIUM COMPLEX (HCC): ICD-10-CM

## 2017-08-04 DIAGNOSIS — J47.9 BRONCHIECTASIS WITHOUT COMPLICATION (HCC): Chronic | ICD-10-CM

## 2017-08-04 DIAGNOSIS — K21.9 GASTROESOPHAGEAL REFLUX DISEASE WITHOUT ESOPHAGITIS: Chronic | ICD-10-CM

## 2017-08-04 DIAGNOSIS — R06.02 SOB (SHORTNESS OF BREATH): ICD-10-CM

## 2017-08-04 PROCEDURE — 71250 CT THORAX DX C-: CPT

## 2017-08-18 ENCOUNTER — OFFICE VISIT (OUTPATIENT)
Dept: FAMILY MEDICINE CLINIC | Facility: CLINIC | Age: 72
End: 2017-08-18

## 2017-08-18 ENCOUNTER — OFFICE VISIT (OUTPATIENT)
Dept: PULMONOLOGY | Facility: CLINIC | Age: 72
End: 2017-08-18

## 2017-08-18 VITALS
SYSTOLIC BLOOD PRESSURE: 150 MMHG | BODY MASS INDEX: 23.39 KG/M2 | RESPIRATION RATE: 16 BRPM | HEART RATE: 64 BPM | TEMPERATURE: 98.2 F | DIASTOLIC BLOOD PRESSURE: 80 MMHG | WEIGHT: 163 LBS

## 2017-08-18 VITALS
TEMPERATURE: 98 F | HEIGHT: 70 IN | OXYGEN SATURATION: 94 % | RESPIRATION RATE: 14 BRPM | SYSTOLIC BLOOD PRESSURE: 152 MMHG | HEART RATE: 66 BPM | WEIGHT: 162 LBS | BODY MASS INDEX: 23.19 KG/M2 | DIASTOLIC BLOOD PRESSURE: 82 MMHG

## 2017-08-18 DIAGNOSIS — G25.81 RESTLESS LEGS SYNDROME: ICD-10-CM

## 2017-08-18 DIAGNOSIS — A31.0 MYCOBACTERIUM AVIUM COMPLEX (HCC): ICD-10-CM

## 2017-08-18 DIAGNOSIS — J47.1 BRONCHIECTASIS WITH ACUTE EXACERBATION (HCC): ICD-10-CM

## 2017-08-18 DIAGNOSIS — R06.02 SHORTNESS OF BREATH: Primary | ICD-10-CM

## 2017-08-18 DIAGNOSIS — J47.1 BRONCHIECTASIS WITH ACUTE EXACERBATION (HCC): Primary | Chronic | ICD-10-CM

## 2017-08-18 LAB
BASOPHILS # BLD AUTO: 0.07 10*3/MM3 (ref 0–0.2)
BASOPHILS NFR BLD AUTO: 0.6 % (ref 0–1)
DEPRECATED RDW RBC AUTO: 44.5 FL (ref 37–54)
EOSINOPHIL # BLD AUTO: 0.51 10*3/MM3 (ref 0–0.3)
EOSINOPHIL NFR BLD AUTO: 4.4 % (ref 0–3)
ERYTHROCYTE [DISTWIDTH] IN BLOOD BY AUTOMATED COUNT: 13.3 % (ref 11.3–14.5)
ERYTHROCYTE [SEDIMENTATION RATE] IN BLOOD: 33 MM/HR (ref 0–20)
HCT VFR BLD AUTO: 45.3 % (ref 38.9–50.9)
HGB BLD-MCNC: 14.8 G/DL (ref 13.1–17.5)
IMM GRANULOCYTES # BLD: 0.03 10*3/MM3 (ref 0–0.03)
IMM GRANULOCYTES NFR BLD: 0.3 % (ref 0–0.6)
LYMPHOCYTES # BLD AUTO: 2.85 10*3/MM3 (ref 0.6–4.8)
LYMPHOCYTES NFR BLD AUTO: 24.8 % (ref 24–44)
MCH RBC QN AUTO: 29.9 PG (ref 27–31)
MCHC RBC AUTO-ENTMCNC: 32.7 G/DL (ref 32–36)
MCV RBC AUTO: 91.5 FL (ref 80–99)
MONOCYTES # BLD AUTO: 0.98 10*3/MM3 (ref 0–1)
MONOCYTES NFR BLD AUTO: 8.5 % (ref 0–12)
NEUTROPHILS # BLD AUTO: 7.06 10*3/MM3 (ref 1.5–8.3)
NEUTROPHILS NFR BLD AUTO: 61.4 % (ref 41–71)
PLATELET # BLD AUTO: 345 10*3/MM3 (ref 150–450)
PMV BLD AUTO: 9.5 FL (ref 6–12)
RBC # BLD AUTO: 4.95 10*6/MM3 (ref 4.2–5.76)
WBC NRBC COR # BLD: 11.5 10*3/MM3 (ref 3.5–10.8)

## 2017-08-18 PROCEDURE — 36415 COLL VENOUS BLD VENIPUNCTURE: CPT | Performed by: INTERNAL MEDICINE

## 2017-08-18 PROCEDURE — 71020 CHG CHEST X-RAY 2 VW: CPT | Performed by: INTERNAL MEDICINE

## 2017-08-18 PROCEDURE — 94060 EVALUATION OF WHEEZING: CPT | Performed by: INTERNAL MEDICINE

## 2017-08-18 PROCEDURE — 99213 OFFICE O/P EST LOW 20 MIN: CPT | Performed by: FAMILY MEDICINE

## 2017-08-18 PROCEDURE — 99214 OFFICE O/P EST MOD 30 MIN: CPT | Performed by: INTERNAL MEDICINE

## 2017-08-18 PROCEDURE — 85652 RBC SED RATE AUTOMATED: CPT | Performed by: INTERNAL MEDICINE

## 2017-08-18 PROCEDURE — 85025 COMPLETE CBC W/AUTO DIFF WBC: CPT | Performed by: INTERNAL MEDICINE

## 2017-08-18 RX ORDER — GABAPENTIN 100 MG/1
100 CAPSULE ORAL 4 TIMES DAILY
Qty: 120 CAPSULE | Refills: 2 | Status: SHIPPED | OUTPATIENT
Start: 2017-08-18 | End: 2017-12-22 | Stop reason: SDUPTHER

## 2017-08-18 NOTE — PROGRESS NOTES
Subjective   Ramírez Hinds is a 71 y.o.  white male whom I last saw in the office for/25/17. I have followed him for quite some time for the following problems:   1. Bronchiectasis.  a. Worse in the upper lobes, left greater than right.  b. Previous growth of staph aureus and 2 species of pseudomonas.   c. MAC growth 01/21/2014 not initially treated but therapy started when chest x-ray worsened and grew again 02/25/2016.  d.Therapy is with Zithromax, ethambutol, rifampin on Monday, Wednesday, Friday. Started March 2016 and is to end after September 2016  e. Had previously been treated with rotating courses of antibiotics Augmentin and Cipro the first week of each month as well as inhaled ellis, which was very effective when grew PSA.   2. COPD  a. FEV1 1.55 (48%) on 1/25/16  3. GERD.  a. History of dyspepsia.   4. History of hypertension.   5. Remote history of pneumonia and “lung cavities”.   6. History of T&A.  7. PENICILLIN allergy and yet can take Augmentin.      History of Present Illness     Patient has done well since I last saw him until about 5 weeks ago. That time he developed a cough which was mildly productive. There was no associated hemoptysis being noted some left lateral chest pain that sounds somewhat pleuritic by his history. He did not have fever or chills. He took 10 days of Augmentin and when his symptoms persisted to 10 days of Cipro at which point he felt better. He then had a routine CT scan of the chest performed 8/4/17 to follow-up regarding his bronchiectasis and his ongoing therapy for MAC. He is doing fairly well at the present time but does still have some cough throughout the day. It is not productive of a significant amount of sputum and there is no hemoptysis. In addition his pleuritic chest pain has resolved.    He remains on Fluticasone/Vilanterol 100 one puff daily and has both albuterol nebulizer and albuterol metered-dose inhaler for prn use    The following portions of the  patient's history were reviewed and updated as appropriate: allergies, current medications, past family history, past medical history, past social history, past surgical history and problem list.    Review of Systems   Constitutional: Negative for appetite change, chills, diaphoresis, fatigue, fever and unexpected weight change.   HENT: Negative for congestion, ear discharge, ear pain, hearing loss, postnasal drip, rhinorrhea, sinus pressure, sneezing, sore throat, trouble swallowing and voice change.    Eyes: Negative for visual disturbance.   Respiratory: Positive for cough and shortness of breath. Negative for choking, chest tightness, wheezing and stridor.    Cardiovascular: Negative for chest pain, palpitations and leg swelling.   Gastrointestinal: Negative for abdominal pain, anal bleeding, blood in stool, constipation, diarrhea, nausea and vomiting.   Endocrine: Negative for cold intolerance, heat intolerance, polydipsia and polyuria.   Genitourinary: Negative for decreased urine volume, difficulty urinating, dysuria, frequency, hematuria and urgency.   Musculoskeletal: Negative for arthralgias, back pain, joint swelling and myalgias.   Skin: Negative for pallor and rash.   Allergic/Immunologic: Negative for environmental allergies, food allergies and immunocompromised state.   Neurological: Positive for dizziness. Negative for seizures, syncope, speech difficulty, weakness and headaches.   Hematological: Negative for adenopathy. Does not bruise/bleed easily.   Psychiatric/Behavioral: Negative for confusion, decreased concentration and sleep disturbance. The patient is not nervous/anxious.    All other systems reviewed and are negative.      Prior to Admission medications    Medication Sig Start Date End Date Taking? Authorizing Provider   albuterol (PROVENTIL HFA;VENTOLIN HFA) 108 (90 BASE) MCG/ACT inhaler ProAir  (90 Base) MCG/ACT Inhalation Aerosol Solution; Patient Sig: ProAir  (90 Base)  "MCG/ACT Inhalation Aerosol Solution INHALE 2 PUFFS FOUR TIMES A DAY FOR COUGH; 1; 11; 02-Jun-2015; Active 6/2/15  Yes Dave Peter MD   albuterol (PROVENTIL) (2.5 MG/3ML) 0.083% nebulizer solution Take 2.5 mg by nebulization Every 4 (Four) Hours As Needed for Wheezing or Shortness of Air. 7/28/17  Yes Dave Peter MD   azithromycin (ZITHROMAX) 500 MG tablet TAKE ONE TABLET BY MOUTH DAILY ON MONDAY, WEDNESDAY AND FRIDAY 6/27/17  Yes Dave Peter MD   diltiazem (TIAZAC) 300 MG 24 hr capsule Take 1 capsule by mouth every morning. 2/29/16  Yes Sachin Garcia MD   ethambutol (MYAMBUTOL) 400 MG tablet TAKE 4 TABLETS BY MOUTH EVERY MONDAY, WEDNESDAY, AND FRIDAY. 6/27/17  Yes Dave Peter MD   Fluticasone Furoate-Vilanterol 100-25 MCG/INH aerosol powder  Inhale 1 puff Daily. 5/25/17  Yes Dave Peter MD   gabapentin (NEURONTIN) 100 MG capsule TAKE ONE TO TWO CAPSULES BY MOUTH NIGHTLY AT BEDTIME 5/10/17  Yes Sachin Garcia MD   hydrochlorothiazide (MICROZIDE) 12.5 MG capsule TAKE ONE CAPSULE BY MOUTH EVERY MORNING 6/6/17  Yes Sachin Garcia MD   omeprazole (priLOSEC) 40 MG capsule Take 1 capsule by mouth Daily. 6/22/17  Yes Dave Peter MD   rifAMPin (RIFADIN) 300 MG capsule TAKE 2 CAPSULES BY MOUTH EVERY MONDAY, WEDNESDAY, AND   FRIDAY. 6/27/17  Yes Dave Peter MD   diltiaZEM CD (CARDIZEM CD) 300 MG 24 hr capsule TAKE ONE CAPSULE BY MOUTH EVERY DAY 7/24/17 8/18/17  Sachin Garcia MD   fluticasone-salmeterol (ADVAIR) 250-50 MCG/DOSE DISKUS 2 (two) times a day. Inhale 1 puff by mouth twice daily. 1/21/14 8/18/17  Dave Peter MD       Objective   Blood pressure 152/82, pulse 66, temperature 98 °F (36.7 °C), resp. rate 14, height 70\" (177.8 cm), weight 162 lb (73.5 kg), SpO2 94 %.    Flowsheet Rows         First Filed Value    Admission Height  70\" (177.8 cm) Documented at 08/18/2017 0845    Admission Weight  162 lb (73.5 kg) Documented at 08/18/2017 " 0845        Physical Exam   Constitutional: He is oriented to person, place, and time. He appears well-developed and well-nourished.   Well-developed thin white male in NAD   HENT:   Head: Normocephalic and atraumatic.   Right Ear: Hearing and external ear normal.   Left Ear: Hearing and external ear normal.   Nose: Nose normal.   Mouth/Throat: Oropharynx is clear and moist. No oropharyngeal exudate.   Eyes: Conjunctivae and EOM are normal. Pupils are equal, round, and reactive to light. Right eye exhibits no discharge. Left eye exhibits no discharge. No scleral icterus.   Neck: Normal range of motion. Neck supple. No JVD present. No tracheal deviation present. No thyromegaly present.   Cardiovascular: Normal rate, regular rhythm, normal heart sounds and intact distal pulses.  Exam reveals no gallop and no friction rub.    No murmur heard.  Pulmonary/Chest: Effort normal. No accessory muscle usage or stridor. No apnea, no tachypnea and no bradypnea. No respiratory distress. He has no wheezes. He has no rhonchi. He has no rales. He exhibits no tenderness.   Slightly diminished in the left base but I hear no wheezes, rales, rhonchi.   Abdominal: Soft. Bowel sounds are normal. He exhibits no distension and no mass. There is no splenomegaly or hepatomegaly. There is no tenderness. There is no rebound and no guarding. No hernia.   Musculoskeletal: Normal range of motion. He exhibits no edema, tenderness or deformity.   Lymphadenopathy:        Head (right side): No submandibular adenopathy present.        Head (left side): No submandibular adenopathy present.     He has no cervical adenopathy.        Right: No supraclavicular and no epitrochlear adenopathy present.        Left: No supraclavicular and no epitrochlear adenopathy present.   Neurological: He is alert and oriented to person, place, and time. He has normal reflexes. He displays normal reflexes. No cranial nerve deficit. He exhibits normal muscle tone.  Coordination normal.   Skin: Skin is warm and dry. No bruising, no ecchymosis, no petechiae and no rash noted. He is not diaphoretic. No cyanosis or erythema. No pallor. Nails show no clubbing.   Psychiatric: He has a normal mood and affect. His speech is normal and behavior is normal. Judgment and thought content normal.   Nursing note and vitals reviewed.    Labs: CBC pending    Chest x-ray: Same extensive fibronodular infiltrates in the left upper lobe there is now a small left pleural effusion    CT scan of the chest 8/4/17: There appears to be a new left pleural effusion and bronchiectasis appears to have worsened slightly bilaterally.    PFTs: FVC 2.63 (60%) use, FEV1 1.46 (48%), FEV1/FVC ratio 56%, MVV 68 (78%).  Assessment/Plan    1. Bronchiectasis.  a. Worse in the upper lobes, left greater than right.  b. Previous growth of staph aureus and 2 species of pseudomonas.   c. MAC growth 01/21/2014 not initially treated but therapy started when chest x-ray worsened and grew again 02/25/2016.  d.Therapy is with Zithromax, ethambutol, rifampin on Monday, Wednesday, Friday. Started March 2016 and is to end after September 2016  e. Had previously been treated with rotating courses of antibiotics Augmentin and Cipro the first week of each month as well as inhaled ellis, which was very effective when grew PSA.   2. COPD  a. FEV1 1.55 (48%) on 1/25/16  3. GERD.  a. History of dyspepsia.   4. History of hypertension.   5. Remote history of pneumonia and “lung cavities”.   6. History of T&A.  7. PENICILLIN allergy and yet can take Augmentin.  8. Worsening chest x-ray and CT scan August 2017. Questionable new infection, failure of MAC to therapy etc.    Discussion: He does not look acutely ill but his chest x-ray and CT scan have definitely worsened and there is a new pleural effusion. It is hard to compare chest x-ray and a pleural effusion at today's chest x-ray shows only a very small pleural effusion while it looked  moderate on the CT scan. It may be resolving spontaneously. I do not know if this is failure of MAC therapy, or he developed an intercurrent pneumonia 5 weeks ago which he partially treated      Plan:  1. Repeat bronchoscopy and bronchoalveolar lavage next week  2. May have to tap left effusion but will not set up for now  3. He is to call Monday 8/21/17 to set up the bronchoscopy with one of my partners as I will be out of town  4. Would like to get a chest x-ray the same day he gets his bronchoscopy because it is possible this pleural effusion is resolving after his 3 week course of antibiotics    Dave Peter MD  Pulmonary and Critical Care Medicine  08/18/17 10:16 AM

## 2017-08-18 NOTE — PROGRESS NOTES
Subjective   Ramírez Hinds is a 71 y.o. male.     History of Present Illness   One month left flank discomfort, took Augmentin and Cipro ten days and did not fee much better.  Pulmonary medicine did CT scan showing pleural exudate.  Scheduled for bronchoscopy.  Frequent sputum before round of antibiotics.  Tried home nebulizer. No SOB.  Tried to wean gabapentin and could not sleep, legs ached. Been treated for MAC 18 months with rifampin and ethambutol.   Needs refills.   The following portions of the patient's history were reviewed and updated as appropriate: allergies, current medications, past family history, past medical history, past social history, past surgical history and problem list.    Review of Systems   Constitutional: Negative.    Respiratory: Positive for cough and chest tightness. Negative for wheezing.    Gastrointestinal: Negative.    Musculoskeletal: Negative.        Objective   Physical Exam   Constitutional: He appears well-developed. No distress.   Cardiovascular: Regular rhythm.    Pulmonary/Chest: He has rhonchi in the left lower field.   Vitals reviewed.      Assessment/Plan   Ramírez was seen today for med refill.    Diagnoses and all orders for this visit:    Bronchiectasis with acute exacerbation    Restless legs syndrome  -     gabapentin (NEURONTIN) 100 MG capsule; Take 1 capsule by mouth 4 (Four) Times a Day.

## 2017-08-21 ENCOUNTER — TRANSCRIBE ORDERS (OUTPATIENT)
Dept: PULMONOLOGY | Facility: CLINIC | Age: 72
End: 2017-08-21

## 2017-08-22 ENCOUNTER — HOSPITAL ENCOUNTER (OUTPATIENT)
Facility: HOSPITAL | Age: 72
Setting detail: HOSPITAL OUTPATIENT SURGERY
Discharge: HOME | End: 2017-08-22
Attending: INTERNAL MEDICINE | Admitting: INTERNAL MEDICINE

## 2017-08-22 ENCOUNTER — APPOINTMENT (OUTPATIENT)
Dept: GENERAL RADIOLOGY | Facility: HOSPITAL | Age: 72
End: 2017-08-22

## 2017-08-22 VITALS
DIASTOLIC BLOOD PRESSURE: 76 MMHG | RESPIRATION RATE: 18 BRPM | HEART RATE: 74 BPM | TEMPERATURE: 99.4 F | SYSTOLIC BLOOD PRESSURE: 144 MMHG | OXYGEN SATURATION: 96 %

## 2017-08-22 DIAGNOSIS — J47.9 BRONCHIECTASIS (HCC): ICD-10-CM

## 2017-08-22 LAB
APPEARANCE FLD: ABNORMAL
COLOR FLD: YELLOW
EOSINOPHIL NFR FLD MANUAL: 24 %
GLUCOSE FLD-MCNC: 90 MG/DL
KOH PREP NAIL: NORMAL
LDH FLD-CCNC: 249 U/L
LYMPHOCYTES NFR FLD MANUAL: 33 %
MACROPHAGE FLUID: 1 %
MESOTHL CELL NFR FLD MANUAL: 3 %
MONOCYTES NFR FLD: 21 %
NEUTROPHILS NFR FLD MANUAL: 18 %
PH FLD: 7.8 [PH]
PROT FLD-MCNC: 4.8 G/DL
RBC # FLD AUTO: 3000 /MM3
WBC # FLD: 1694 /MM3

## 2017-08-22 PROCEDURE — 87206 SMEAR FLUORESCENT/ACID STAI: CPT | Performed by: INTERNAL MEDICINE

## 2017-08-22 PROCEDURE — 87220 TISSUE EXAM FOR FUNGI: CPT | Performed by: INTERNAL MEDICINE

## 2017-08-22 PROCEDURE — 25010000002 DIPHENHYDRAMINE PER 50 MG: Performed by: INTERNAL MEDICINE

## 2017-08-22 PROCEDURE — 83615 LACTATE (LD) (LDH) ENZYME: CPT | Performed by: INTERNAL MEDICINE

## 2017-08-22 PROCEDURE — 71010 HC CHEST PA OR AP: CPT

## 2017-08-22 PROCEDURE — 87015 SPECIMEN INFECT AGNT CONCNTJ: CPT | Performed by: INTERNAL MEDICINE

## 2017-08-22 PROCEDURE — 87070 CULTURE OTHR SPECIMN AEROBIC: CPT | Performed by: INTERNAL MEDICINE

## 2017-08-22 PROCEDURE — 89051 BODY FLUID CELL COUNT: CPT | Performed by: INTERNAL MEDICINE

## 2017-08-22 PROCEDURE — 25010000002 MIDAZOLAM PER 1 MG: Performed by: INTERNAL MEDICINE

## 2017-08-22 PROCEDURE — 31645 BRNCHSC W/THER ASPIR 1ST: CPT | Performed by: INTERNAL MEDICINE

## 2017-08-22 PROCEDURE — 87186 SC STD MICRODIL/AGAR DIL: CPT | Performed by: INTERNAL MEDICINE

## 2017-08-22 PROCEDURE — 84157 ASSAY OF PROTEIN OTHER: CPT | Performed by: INTERNAL MEDICINE

## 2017-08-22 PROCEDURE — 32555 ASPIRATE PLEURA W/ IMAGING: CPT | Performed by: INTERNAL MEDICINE

## 2017-08-22 PROCEDURE — 87077 CULTURE AEROBIC IDENTIFY: CPT | Performed by: INTERNAL MEDICINE

## 2017-08-22 PROCEDURE — 87102 FUNGUS ISOLATION CULTURE: CPT | Performed by: INTERNAL MEDICINE

## 2017-08-22 PROCEDURE — 87075 CULTR BACTERIA EXCEPT BLOOD: CPT | Performed by: INTERNAL MEDICINE

## 2017-08-22 PROCEDURE — 87252 VIRUS INOCULATION TISSUE: CPT | Performed by: INTERNAL MEDICINE

## 2017-08-22 PROCEDURE — 87116 MYCOBACTERIA CULTURE: CPT | Performed by: INTERNAL MEDICINE

## 2017-08-22 PROCEDURE — 25010000002 FENTANYL CITRATE (PF) 100 MCG/2ML SOLUTION: Performed by: INTERNAL MEDICINE

## 2017-08-22 PROCEDURE — 82945 GLUCOSE OTHER FLUID: CPT | Performed by: INTERNAL MEDICINE

## 2017-08-22 PROCEDURE — 87205 SMEAR GRAM STAIN: CPT | Performed by: INTERNAL MEDICINE

## 2017-08-22 PROCEDURE — 83986 ASSAY PH BODY FLUID NOS: CPT | Performed by: INTERNAL MEDICINE

## 2017-08-22 PROCEDURE — 31624 DX BRONCHOSCOPE/LAVAGE: CPT | Performed by: INTERNAL MEDICINE

## 2017-08-22 RX ORDER — MIDAZOLAM HYDROCHLORIDE 5 MG/ML
INJECTION INTRAMUSCULAR; INTRAVENOUS AS NEEDED
Status: COMPLETED | OUTPATIENT
Start: 2017-08-22 | End: 2017-08-22

## 2017-08-22 RX ORDER — FENTANYL CITRATE 50 UG/ML
INJECTION, SOLUTION INTRAMUSCULAR; INTRAVENOUS AS NEEDED
Status: COMPLETED | OUTPATIENT
Start: 2017-08-22 | End: 2017-08-22

## 2017-08-22 RX ORDER — DIPHENHYDRAMINE HYDROCHLORIDE 50 MG/ML
INJECTION INTRAMUSCULAR; INTRAVENOUS AS NEEDED
Status: COMPLETED | OUTPATIENT
Start: 2017-08-22 | End: 2017-08-22

## 2017-08-22 RX ADMIN — FENTANYL CITRATE 25 MCG: 50 INJECTION, SOLUTION INTRAMUSCULAR; INTRAVENOUS at 10:30

## 2017-08-22 RX ADMIN — MIDAZOLAM HYDROCHLORIDE 2 MG: 5 INJECTION, SOLUTION INTRAMUSCULAR; INTRAVENOUS at 10:25

## 2017-08-22 RX ADMIN — DIPHENHYDRAMINE HYDROCHLORIDE 50 MG: 50 INJECTION INTRAMUSCULAR; INTRAVENOUS at 10:26

## 2017-08-22 RX ADMIN — FENTANYL CITRATE 50 MCG: 50 INJECTION, SOLUTION INTRAMUSCULAR; INTRAVENOUS at 10:26

## 2017-08-22 RX ADMIN — MIDAZOLAM HYDROCHLORIDE 1 MG: 5 INJECTION, SOLUTION INTRAMUSCULAR; INTRAVENOUS at 10:30

## 2017-08-22 NOTE — INTERVAL H&P NOTE
No changes in interval, proceed with bronchoscopy.  Ultrasound to see if thoracentesis needed.    Mario Laguerre MD  Pulmonology and Critical Care Medicine  08/22/17 11:16 AM  Electronically Signed

## 2017-08-22 NOTE — OP NOTE
PREOPERATIVE DIAGNOSES:   1. Chronic obstructive pulmonary disease.   2. History of Mycobacterium avium complex.    POSTOPERATIVE DIAGNOSES:   1. Chronic obstructive pulmonary disease.   2. History of Mycobacterium avium complex.    PROCEDURES PERFORMED:  1. Diagnostic flexible bronchoscopy.  2. Therapeutic suctioning of multiple lobes.   3. Bronchoalveolar lavage, left lower lobe superior segment, 90 mL in, 30 mL out.   4. Left upper lobe apical segment 60 mL in, 30 mL out.   5. Ultrasound-guided thoracentesis of the left lung with drainage of 350 mL of pleural fluid.     SURGEON: Mario Laguerre MD    INDICATIONS FOR PROCEDURE: Diagnosis of MAC versus other infectious process.     DESCRIPTION OF PROCEDURE: Consent was obtained from the patient. The patient was given a total of 75 mcg of fentanyl, 3 mg of Versed, and 50 mg of Benadryl for conscious sedation. His oropharynx was anesthetized with local lidocaine. The bronchoscope was then introduced through the oropharynx. The vocal cords had normal abduction and adduction. The trachea was intubated and the airways were examined to the segmental level and were anatomically normal except for some mild diffuse erythema consistent with chronic bronchitis. There were thick mucoid secretions scattered throughout the tracheobronchial tree that were therapeutically suctioned from multiple lobes. The bronchoscope was then wedged in the left lower lobe superior segment, 90 mL of saline was instilled with return of 30 mL for a bronchoalveolar lavage. We then performed a 2nd BAL in the left upper lobe apical segment, 60 mL in, 20 mL out. The airways were then suctioned clear. The bronchoscope was removed. The patient was then sat up and he leaned forward across a bedside table. The left hemithorax was examined with ultrasound and a small- to moderate-size pleural effusion was identified in the left pleural space. The site was marked. The area was prepped and draped  in the usual sterile fashion. The area was anesthetized with 1% lidocaine without epinephrine. Then a nick was made in the skin with a #10 blade scalpel and the thoracentesis catheter was inserted over the needle into the pleural space. There was good drainage of pleural fluid. We drained approximately 350 mL of slightly cloudy yellow pleural fluid. The patient tolerated both procedures well with no immediate postprocedure complications. No obvious pneumothorax identified on my reading of chest x-ray.     Mario Laguerre MD  Pulmonology and Critical Care Medicine  08/22/17 11:20 AM  Electronically Signed

## 2017-08-23 LAB
GIE STN SPEC: NORMAL
GIE STN SPEC: NORMAL
LAB AP CASE REPORT: NORMAL
Lab: NORMAL
PATH REPORT.FINAL DX SPEC: NORMAL

## 2017-08-25 LAB
BACTERIA SPEC RESP CULT: ABNORMAL
GRAM STN SPEC: ABNORMAL

## 2017-08-26 LAB
BACTERIA FLD CULT: NORMAL
GRAM STN SPEC: NORMAL
GRAM STN SPEC: NORMAL

## 2017-08-29 LAB — BACTERIA SPEC ANAEROBE CULT: NORMAL

## 2017-08-30 LAB — VIRAL CULTURE, GENERAL: NORMAL

## 2017-08-31 ENCOUNTER — OFFICE VISIT (OUTPATIENT)
Dept: PULMONOLOGY | Facility: CLINIC | Age: 72
End: 2017-08-31

## 2017-08-31 VITALS
HEART RATE: 87 BPM | SYSTOLIC BLOOD PRESSURE: 130 MMHG | TEMPERATURE: 101.1 F | DIASTOLIC BLOOD PRESSURE: 68 MMHG | HEIGHT: 69 IN | BODY MASS INDEX: 24.26 KG/M2 | WEIGHT: 163.8 LBS | OXYGEN SATURATION: 91 % | RESPIRATION RATE: 16 BRPM

## 2017-08-31 DIAGNOSIS — Z11.4 ENCOUNTER FOR SCREENING FOR HIV: ICD-10-CM

## 2017-08-31 DIAGNOSIS — J45.991 COUGH VARIANT ASTHMA: ICD-10-CM

## 2017-08-31 DIAGNOSIS — R63.4 WEIGHT LOSS: ICD-10-CM

## 2017-08-31 DIAGNOSIS — J47.1 BRONCHIECTASIS WITH ACUTE EXACERBATION (HCC): Chronic | ICD-10-CM

## 2017-08-31 DIAGNOSIS — A31.0 MYCOBACTERIUM AVIUM COMPLEX (HCC): Primary | ICD-10-CM

## 2017-08-31 LAB
EOSINOPHIL # BLD AUTO: 0.06 10*3/MM3 (ref 0–0.3)
EOSINOPHIL NFR BLD AUTO: 0.4 % (ref 0–3)
HIV1+2 AB SER QL: NORMAL

## 2017-08-31 PROCEDURE — 86698 HISTOPLASMA ANTIBODY: CPT | Performed by: INTERNAL MEDICINE

## 2017-08-31 PROCEDURE — 86003 ALLG SPEC IGE CRUDE XTRC EA: CPT | Performed by: INTERNAL MEDICINE

## 2017-08-31 PROCEDURE — 83520 IMMUNOASSAY QUANT NOS NONAB: CPT | Performed by: INTERNAL MEDICINE

## 2017-08-31 PROCEDURE — 86606 ASPERGILLUS ANTIBODY: CPT | Performed by: INTERNAL MEDICINE

## 2017-08-31 PROCEDURE — 86256 FLUORESCENT ANTIBODY TITER: CPT | Performed by: INTERNAL MEDICINE

## 2017-08-31 PROCEDURE — 86160 COMPLEMENT ANTIGEN: CPT | Performed by: INTERNAL MEDICINE

## 2017-08-31 PROCEDURE — 99214 OFFICE O/P EST MOD 30 MIN: CPT | Performed by: INTERNAL MEDICINE

## 2017-08-31 PROCEDURE — 71020 CHG CHEST X-RAY 2 VW: CPT | Performed by: INTERNAL MEDICINE

## 2017-08-31 PROCEDURE — G0432 EIA HIV-1/HIV-2 SCREEN: HCPCS | Performed by: INTERNAL MEDICINE

## 2017-08-31 PROCEDURE — 82787 IGG 1 2 3 OR 4 EACH: CPT | Performed by: INTERNAL MEDICINE

## 2017-08-31 PROCEDURE — 86612 BLASTOMYCES ANTIBODY: CPT | Performed by: INTERNAL MEDICINE

## 2017-08-31 PROCEDURE — 82785 ASSAY OF IGE: CPT | Performed by: INTERNAL MEDICINE

## 2017-08-31 PROCEDURE — 86480 TB TEST CELL IMMUN MEASURE: CPT | Performed by: INTERNAL MEDICINE

## 2017-08-31 PROCEDURE — 86635 COCCIDIOIDES ANTIBODY: CPT | Performed by: INTERNAL MEDICINE

## 2017-08-31 PROCEDURE — 86038 ANTINUCLEAR ANTIBODIES: CPT | Performed by: INTERNAL MEDICINE

## 2017-08-31 PROCEDURE — 85048 AUTOMATED LEUKOCYTE COUNT: CPT | Performed by: INTERNAL MEDICINE

## 2017-08-31 PROCEDURE — 86200 CCP ANTIBODY: CPT | Performed by: INTERNAL MEDICINE

## 2017-08-31 PROCEDURE — 86162 COMPLEMENT TOTAL (CH50): CPT | Performed by: INTERNAL MEDICINE

## 2017-08-31 PROCEDURE — 86431 RHEUMATOID FACTOR QUANT: CPT | Performed by: INTERNAL MEDICINE

## 2017-08-31 PROCEDURE — 82103 ALPHA-1-ANTITRYPSIN TOTAL: CPT | Performed by: INTERNAL MEDICINE

## 2017-08-31 RX ORDER — TOBRAMYCIN INHALATION 300 MG/4ML
4 SOLUTION RESPIRATORY (INHALATION) 2 TIMES DAILY
Qty: 224 ML | Refills: 0 | Status: SHIPPED | OUTPATIENT
Start: 2017-08-31 | End: 2017-09-28

## 2017-08-31 RX ORDER — ONDANSETRON 4 MG/1
1 TABLET, ORALLY DISINTEGRATING ORAL EVERY 6 HOURS PRN
COMMUNITY
Start: 2016-03-03 | End: 2017-09-22 | Stop reason: SDUPTHER

## 2017-09-01 LAB
A1AT SERPL-MCNC: 207 MG/DL (ref 90–200)
C4 SERPL-MCNC: 36 MG/DL (ref 14–44)
CH50 SERPL-ACNC: >60 U/ML (ref 42–60)
IGM SERPL-MCNC: 84 MG/DL (ref 15–143)
RA LATEX TURBID: 14.3 IU/ML (ref 0–13.9)

## 2017-09-03 LAB
A ALTERNATA IGE QN: <0.1 KU/L
A FUMIGATUS IGE QN: <0.1 KU/L
AMER ROACH IGE QN: <0.1 KU/L
BAHIA GRASS IGE QN: <0.1 KU/L
BERMUDA GRASS IGE QN: <0.1 KU/L
BOXELDER IGE QN: <0.1 KU/L
C HERBARUM IGE QN: <0.1 KU/L
CAT DANDER IGG QN: <0.1 KU/L
CMN PIGWEED IGE QN: <0.1 KU/L
COMMON RAGWEED IGE QN: <0.1 KU/L
CONV CLASS DESCRIPTION: NORMAL
D FARINAE IGE QN: <0.1 KU/L
D PTERONYSS IGE QN: <0.1 KU/L
DOG DANDER IGE QN: <0.1 KU/L
ENGL PLANTAIN IGE QN: <0.1 KU/L
HAZELNUT POLN IGE QN: <0.1 KU/L
JOHNSON GRASS IGE QN: <0.1 KU/L
KENT BLUE GRASS IGE QN: <0.1 KU/L
M RACEMOSUS IGE QN: <0.1 KU/L
MT JUNIPER IGE QN: <0.1 KU/L
MUGWORT IGE QN: <0.1 KU/L
NETTLE IGE QN: <0.1 KU/L
P NOTATUM IGE QN: <0.1 KU/L
S BOTRYOSUM IGE QN: <0.1 KU/L
SHEEP SORREL IGE QN: <0.1 KU/L
SWEET GUM IGE QN: <0.1 KU/L
T011-IGE MAPLE LEAF SYCAMORE: <0.1 KU/L
WHITE ELM IGE QN: <0.1 KU/L
WHITE HICKORY IGE QN: <0.1 KU/L
WHITE MULBERRY IGE QN: <0.1 KU/L
WHITE OAK IGE QN: <0.1 KU/L

## 2017-09-04 LAB
C-ANCA TITR SER IF: NORMAL TITER
C3 SERPL-MCNC: 176 MG/DL (ref 82–167)
CCP IGA+IGG SERPL IA-ACNC: 4 UNITS (ref 0–19)
IGG SERPL-MCNC: 1206 MG/DL (ref 700–1600)
IGG1 SER-MCNC: 606 MG/DL (ref 248–810)
IGG2 SER-MCNC: 426 MG/DL (ref 130–555)
IGG3 SER-MCNC: 143 MG/DL (ref 15–102)
IGG4 SER-MCNC: 14 MG/DL (ref 2–96)
MYELOPEROXIDASE AB SER-ACNC: <9 U/ML (ref 0–9)
P-ANCA ATYPICAL TITR SER IF: NORMAL TITER
P-ANCA TITR SER IF: NORMAL TITER
PROTEINASE3 AB SER IA-ACNC: <3.5 U/ML (ref 0–3.5)
TOTAL IGE SMQN RAST: 2 IU/ML (ref 0–100)
VIRAL CULTURE, GENERAL: NORMAL

## 2017-09-05 LAB
ANA SER QL: NEGATIVE
IGA SERPL-MCNC: 513 MG/DL (ref 61–437)

## 2017-09-06 LAB
A FLAVUS AB SER QL ID: NEGATIVE
A FUMIGATUS AB SER QL ID: NEGATIVE
A NIGER AB SER QL ID: NEGATIVE
B DERMAT AB TITR SER: NEGATIVE {TITER}
C IMMITIS AB TITR SER ID: NORMAL {TITER}
H CAPSUL AB TITR SER ID: NEGATIVE {TITER}

## 2017-09-07 LAB
INTERPRETATION: NORMAL
M TB TUBERC IFN-G BLD QL: NEGATIVE
QFT TB AG MINUS NIL VALUE: 0.01 IU/ML
QUANTIFERON CRITERIA: NORMAL
QUANTIFERON MITOGEN VALUE: 2.16 IU/ML
QUANTIFERON NIL VALUE: 0.13 IU/ML
QUANTIFERON TB AG VALUE: 0.14 IU/ML

## 2017-09-14 LAB — CFTR MUT ANL BLD/T: NORMAL

## 2017-09-22 ENCOUNTER — OFFICE VISIT (OUTPATIENT)
Dept: FAMILY MEDICINE CLINIC | Facility: CLINIC | Age: 72
End: 2017-09-22

## 2017-09-22 VITALS
RESPIRATION RATE: 16 BRPM | HEART RATE: 72 BPM | WEIGHT: 148 LBS | BODY MASS INDEX: 21.86 KG/M2 | SYSTOLIC BLOOD PRESSURE: 130 MMHG | DIASTOLIC BLOOD PRESSURE: 80 MMHG | TEMPERATURE: 98.1 F

## 2017-09-22 DIAGNOSIS — R11.0 NAUSEA: Primary | ICD-10-CM

## 2017-09-22 PROCEDURE — 99213 OFFICE O/P EST LOW 20 MIN: CPT | Performed by: FAMILY MEDICINE

## 2017-09-22 RX ORDER — ONDANSETRON 4 MG/1
4 TABLET, ORALLY DISINTEGRATING ORAL EVERY 6 HOURS PRN
Qty: 20 TABLET | Refills: 1 | Status: SHIPPED | OUTPATIENT
Start: 2017-09-22 | End: 2018-01-18

## 2017-09-22 NOTE — PROGRESS NOTES
Subjective   Ramírez Hinds is a 71 y.o. male.     History of Present Illness   MAC chest infection changed to Pseudomonas after bronchoscopy, being treated with tobramycin inhalation therapy.  Had chest xray.  Nausea and abdominal discomfort for 18 months taking MAC medications three times a week (ethambutol, azithromycin and rifampin).  Takes monthly blood screen all negative except . Takes Pepto Bismol each day.  Recent dark stools.  Stopped Pepto and stool color returned.  Feverish in past, stopped after starting Tobramycin.   Easily fatigued, no more night sweats.  Nausea often.   The following portions of the patient's history were reviewed and updated as appropriate: allergies, current medications, past family history, past medical history, past social history, past surgical history and problem list.    Review of Systems   Constitutional: Positive for fatigue.   HENT: Negative for sinus pressure.    Cardiovascular: Negative for chest pain.   Gastrointestinal: Positive for abdominal pain and nausea.   Neurological: Positive for weakness.   Hematological: Negative.        Objective   Physical Exam   Constitutional: He appears well-developed.   HENT:   Mouth/Throat: Oropharynx is clear and moist.   Neck: Neck supple.   Pulmonary/Chest: He has decreased breath sounds in the left lower field.   Long inspiratory phase   Abdominal: He exhibits no distension and no mass.   Genitourinary: Rectum normal. Rectal exam shows guaiac negative stool.   Lymphadenopathy:     He has no cervical adenopathy.   Vitals reviewed.      Assessment/Plan   Jack was seen today for gi problem.    Diagnoses and all orders for this visit:    Nausea  -     ondansetron ODT (ZOFRAN-ODT) 4 MG disintegrating tablet; Place 1 tablet under the tongue Every 6 (Six) Hours As Needed for Nausea.      Continue Prevacid daily, use Zofran as needed for nausea.  Consider endoscopy if continued discomfort after completion of MAC medication.

## 2017-10-03 LAB
FUNGUS WND CULT: ABNORMAL
FUNGUS WND CULT: ABNORMAL
FUNGUS WND CULT: NORMAL
FUNGUS WND CULT: NORMAL
MYCOBACTERIUM SPEC CULT: NORMAL
NIGHT BLUE STAIN TISS: NORMAL

## 2017-10-06 ENCOUNTER — TRANSCRIBE ORDERS (OUTPATIENT)
Dept: PULMONOLOGY | Facility: CLINIC | Age: 72
End: 2017-10-06

## 2017-10-06 ENCOUNTER — OFFICE VISIT (OUTPATIENT)
Dept: PULMONOLOGY | Facility: CLINIC | Age: 72
End: 2017-10-06

## 2017-10-06 VITALS
HEART RATE: 64 BPM | DIASTOLIC BLOOD PRESSURE: 64 MMHG | OXYGEN SATURATION: 93 % | SYSTOLIC BLOOD PRESSURE: 126 MMHG | TEMPERATURE: 98.8 F | BODY MASS INDEX: 23.7 KG/M2 | RESPIRATION RATE: 16 BRPM | HEIGHT: 69 IN | WEIGHT: 160 LBS

## 2017-10-06 DIAGNOSIS — E84.9 CYSTIC FIBROSIS (HCC): ICD-10-CM

## 2017-10-06 DIAGNOSIS — Z23 ENCOUNTER FOR IMMUNIZATION: ICD-10-CM

## 2017-10-06 DIAGNOSIS — Z22.39 PSEUDOMONAS AERUGINOSA COLONIZATION: ICD-10-CM

## 2017-10-06 DIAGNOSIS — A31.0 MYCOBACTERIUM AVIUM COMPLEX (HCC): ICD-10-CM

## 2017-10-06 DIAGNOSIS — R06.02 SHORTNESS OF BREATH: Primary | ICD-10-CM

## 2017-10-06 DIAGNOSIS — J47.9 BRONCHIECTASIS WITHOUT COMPLICATION (HCC): Chronic | ICD-10-CM

## 2017-10-06 PROCEDURE — 71020 CHG CHEST X-RAY 2 VW: CPT | Performed by: INTERNAL MEDICINE

## 2017-10-06 PROCEDURE — 94729 DIFFUSING CAPACITY: CPT | Performed by: INTERNAL MEDICINE

## 2017-10-06 PROCEDURE — 94726 PLETHYSMOGRAPHY LUNG VOLUMES: CPT | Performed by: INTERNAL MEDICINE

## 2017-10-06 PROCEDURE — 94060 EVALUATION OF WHEEZING: CPT | Performed by: INTERNAL MEDICINE

## 2017-10-06 PROCEDURE — 90662 IIV NO PRSV INCREASED AG IM: CPT | Performed by: INTERNAL MEDICINE

## 2017-10-06 PROCEDURE — G0008 ADMIN INFLUENZA VIRUS VAC: HCPCS | Performed by: INTERNAL MEDICINE

## 2017-10-06 PROCEDURE — G0009 ADMIN PNEUMOCOCCAL VACCINE: HCPCS | Performed by: INTERNAL MEDICINE

## 2017-10-06 PROCEDURE — 99214 OFFICE O/P EST MOD 30 MIN: CPT | Performed by: INTERNAL MEDICINE

## 2017-10-06 PROCEDURE — 90670 PCV13 VACCINE IM: CPT | Performed by: INTERNAL MEDICINE

## 2017-10-06 NOTE — PROGRESS NOTES
"CC:  F/u for bronch and MAC / Bronchiectasis    HPI:  70 y/o WM w/ h/o bronchiectasis, non-smoker, no obvious exposures, followed by my partner Dr. Dave Peter for some time now.  He had grown staph and pseudomonas on prior cultures and had been alternating abx monthly with augmentin and cipro.  Has also used Darrel nebs in the past.  More recently he developed MAC which wasn't treated until worsening x ray starting 2/25/16.  He was started on a 3x/wk regimen and has been tolerating it ok.  He is to complete 18 months of therapy through September.  Recently his CXR worsened and I performed a bronchoscopy and left sided thoracentesis 8/22/17.  Thora was c/w parapneumonic effusion, cultures from BAL brew pseudomonas resistant to quinolones.  AFB final cultures are negative for MAC after 18 months of treatment that was completed at the end of September.  Patient was placed on 4 weeks of nebulized aminoglycoside.    INTERVAL HISTORY:    Mr. Hinds returns for follow up.  His bronchiectasis workup revealed 2 different mutations resulting in non-classical cystic fibrosis.  He feels \"worn down\" with low energy.  Still doing pulmonary toilet.  Productive sputum.  No new complaints.    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 Aureus and Pseudomonas  COPD (non-smoker)  GERD  HTN  BPH    PSH:  T&A  Cataracts    FH:  No lung diseases    SH:  Smoked 4 years in college, basically a lifetime non-smoker.  Works as a landlord, has done some remodeling.  No major pulmonary exposures (No mold, hot tubs, pet birds, etc).    ALLERGIES:  PCN, but able to take augmentin    MEDICATIONS:  " Reviewed    DIAGNOSTIC DATA (Reviewed and interpreted by me):    PFT ([facility] only) 8/22/17 - severe obstruction    PFT - Severe Obstruction, FEV1 43%, no restriction, +air trapping, normal DLCO    CT 1/23/2004 - bilateral upper lobe bronchiectasis L >> R    CT 8/4/2017 - bilateral upper lobe bronchiectasis L>>R, worsening in LLL sup segment, small to moderate left pleural effusion.  ADALBERTO more fibrotic compared to 2004.    CXR 8/31/17 - upper lobe predominant bronchiectasis, no change from 8/18/17    Physical Exam   Constitutional: Oriented to person, place, and time. Appears well-developed and well-nourished.   Head: Normocephalic and atraumatic.   Nose: Nose normal.   Mouth/Throat: Oropharynx is clear and moist.   Eyes: Conjunctivae are normal.   Neck: No tracheal deviation present.   Cardiovascular: Normal rate, regular rhythm, normal heart sounds and intact distal pulses.  Exam reveals no gallop and no friction rub.    No murmur heard.  Pulmonary/Chest: Effort normal and breath sounds w/ mild rhonchi. No stridor. No respiratory distress. No wheezes. No rales. No tenderness.   Abdominal: Soft. Bowel sounds are normal. No distension. No tenderness. There is no guarding.   Musculoskeletal: Normal range of motion. No edema.   Lymphadenopathy:  No cervical adenopathy.   Neurological: Alert and oriented to person, place, and time.  No Focal Neurological Deficits Observed   Skin: Skin is warm and dry. No rash noted.   Psychiatric: Normal mood and affect.  Behavior is normal. Judgment normal.     Impression & Plan    1)  Bronchiectasis - surprisingly he was found to have non-classical Cystic Fibrosis on genetic testing.  Auto-immune w/u neg, immunoglobulins normal.  Will refer to [facility] to see if they have any other recommendations.  I emphasized the importance of pulmonary toilet and this is the regimen he will be on:  Formoterol + Pulmozye nebs + flutter bid, Spiriva once daily, Darrel Pod Haler 2 weeks on 2 weeks  off (will need to get PA).  D/c Breo w/ ICS as I think the ICS component could increase his risk for pneumonia and he does not appear to have any asthma component.  IGE level was 2 and IGE to aspergillus was negative, no evidence of ABPA.  He has quinolone resistant pseudomonas in his airways and I think he will need this to prevent further exacerbations of CF and permanent loss of lung function.    2)  MAC - completed 18 months of 3 d/wk therapy at the end of Sept 2017, at the end of therapy BAL did not recover any MAC - thus I think for now he is cured.  Check sputum next visit for bacteria and afb.    RTC 3 months w/ PFTs and CXR, will also send sputum next visit    Flu shot and Prevnar today, has already had Pneumovax    Mario Laguerre MD  Pulmonology and Critical Care Medicine  10/06/17 11:39 AM  Electronically Signed

## 2017-10-09 DIAGNOSIS — J44.9 CHRONIC OBSTRUCTIVE PULMONARY DISEASE, UNSPECIFIED COPD TYPE (HCC): Primary | ICD-10-CM

## 2017-10-10 LAB
MYCOBACTERIUM SPEC CULT: NORMAL
MYCOBACTERIUM SPEC CULT: NORMAL
NIGHT BLUE STAIN TISS: NORMAL
NIGHT BLUE STAIN TISS: NORMAL

## 2017-10-11 RX ORDER — FORMOTEROL FUMARATE 20 UG/2ML
20 SOLUTION RESPIRATORY (INHALATION)
Qty: 120 EACH | Refills: 5 | Status: SHIPPED | OUTPATIENT
Start: 2017-10-11 | End: 2023-01-12 | Stop reason: ALTCHOICE

## 2017-10-30 ENCOUNTER — DOCUMENTATION (OUTPATIENT)
Dept: PULMONOLOGY | Facility: CLINIC | Age: 72
End: 2017-10-30

## 2017-10-30 NOTE — PROGRESS NOTES
Pulmozyme rx changed to once daily instead of BID per insurance requirements. OK'd per Mario. Called pt to inform him of change, MIRYAM.

## 2017-11-09 RX ORDER — AZITHROMYCIN 500 MG/1
TABLET, FILM COATED ORAL
Qty: 12 TABLET | Refills: 6 | Status: SHIPPED | OUTPATIENT
Start: 2017-11-09 | End: 2017-12-22

## 2017-12-05 DIAGNOSIS — G25.81 RESTLESS LEGS SYNDROME: ICD-10-CM

## 2017-12-05 RX ORDER — GABAPENTIN 100 MG/1
CAPSULE ORAL
Qty: 120 CAPSULE | Refills: 0 | OUTPATIENT
Start: 2017-12-05

## 2017-12-07 DIAGNOSIS — J44.9 CHRONIC OBSTRUCTIVE PULMONARY DISEASE, UNSPECIFIED COPD TYPE (HCC): ICD-10-CM

## 2017-12-07 RX ORDER — ALBUTEROL SULFATE 2.5 MG/3ML
2.5 SOLUTION RESPIRATORY (INHALATION) EVERY 4 HOURS PRN
Qty: 375 ML | Refills: 11 | Status: SHIPPED | OUTPATIENT
Start: 2017-12-07 | End: 2019-03-27 | Stop reason: SDUPTHER

## 2017-12-22 ENCOUNTER — OFFICE VISIT (OUTPATIENT)
Dept: FAMILY MEDICINE CLINIC | Facility: CLINIC | Age: 72
End: 2017-12-22

## 2017-12-22 VITALS
OXYGEN SATURATION: 93 % | TEMPERATURE: 97.9 F | HEART RATE: 64 BPM | BODY MASS INDEX: 24.25 KG/M2 | HEIGHT: 70 IN | SYSTOLIC BLOOD PRESSURE: 158 MMHG | RESPIRATION RATE: 12 BRPM | DIASTOLIC BLOOD PRESSURE: 62 MMHG | WEIGHT: 169.4 LBS

## 2017-12-22 DIAGNOSIS — I10 ESSENTIAL HYPERTENSION: Chronic | ICD-10-CM

## 2017-12-22 DIAGNOSIS — R10.9 FLANK PAIN: Primary | ICD-10-CM

## 2017-12-22 DIAGNOSIS — E84.9 CYSTIC FIBROSIS (HCC): ICD-10-CM

## 2017-12-22 DIAGNOSIS — G25.81 RESTLESS LEGS SYNDROME: ICD-10-CM

## 2017-12-22 LAB
BILIRUB BLD-MCNC: NEGATIVE MG/DL
CLARITY, POC: CLEAR
COLOR UR: YELLOW
GLUCOSE UR STRIP-MCNC: NEGATIVE MG/DL
KETONES UR QL: NEGATIVE
LEUKOCYTE EST, POC: NEGATIVE
NITRITE UR-MCNC: NEGATIVE MG/ML
PH UR: 6 [PH] (ref 5–8)
PROT UR STRIP-MCNC: NEGATIVE MG/DL
RBC # UR STRIP: NEGATIVE /UL
SP GR UR: 1.01 (ref 1–1.03)
UROBILINOGEN UR QL: NORMAL

## 2017-12-22 PROCEDURE — 99213 OFFICE O/P EST LOW 20 MIN: CPT | Performed by: FAMILY MEDICINE

## 2017-12-22 PROCEDURE — 81003 URINALYSIS AUTO W/O SCOPE: CPT | Performed by: FAMILY MEDICINE

## 2017-12-22 RX ORDER — GABAPENTIN 100 MG/1
100 CAPSULE ORAL 4 TIMES DAILY
Qty: 120 CAPSULE | Refills: 2 | Status: SHIPPED | OUTPATIENT
Start: 2017-12-22 | End: 2018-09-18 | Stop reason: SDUPTHER

## 2017-12-22 NOTE — PROGRESS NOTES
Subjective   Ramírez Hinds is a 72 y.o. male.     History of Present Illness   Diagnosed with Cystic Fibrosis.  Treated by pulmonary medicine with Pulmazone inhaler to thin mucus.  Given agent to improve ciliary activity, lungs have dramatically improved. No diarrhea or chronic bowel trouble.  Off MAC medications. Less cough past two weeks. No more sputum.   Now left upper flank pains, no dysuria and no fever. Started after doing water aerobics.  No chills.  Appetite good. Discomfort LUQ  Into left posterior chest.  Had thoracentesis three months ago.  Improved taking aspirin.   Needs refill for gabapentin takes for peripheral pain.   The following portions of the patient's history were reviewed and updated as appropriate: allergies, current medications, past family history, past medical history, past social history, past surgical history and problem list.    Review of Systems   Constitutional: Negative for chills, fatigue and unexpected weight change.   HENT: Negative for sore throat.    Respiratory: Negative for cough, chest tightness and shortness of breath.    Cardiovascular: Negative.    Genitourinary: Negative for dysuria.       Objective   Physical Exam   Constitutional: He appears well-developed. No distress.   Pulmonary/Chest: He has no decreased breath sounds. He has no wheezes. He exhibits no tenderness.   Long inspiratory phase no wheeze.   Abdominal: Soft. He exhibits no distension and no mass. There is no tenderness.   Neurological: He is alert.   Vitals reviewed.      Assessment/Plan   Ramírez was seen today for flank pain.    Diagnoses and all orders for this visit:    Flank pain  -     POCT urinalysis dipstick, automated    Cystic fibrosis    Essential hypertension    Restless legs syndrome  -     gabapentin (NEURONTIN) 100 MG capsule; Take 1 capsule by mouth 4 (Four) Times a Day.

## 2018-01-06 DIAGNOSIS — I10 ESSENTIAL HYPERTENSION: ICD-10-CM

## 2018-01-08 RX ORDER — HYDROCHLOROTHIAZIDE 12.5 MG/1
CAPSULE, GELATIN COATED ORAL
Qty: 90 CAPSULE | Refills: 0 | Status: SHIPPED | OUTPATIENT
Start: 2018-01-08 | End: 2018-04-05 | Stop reason: SDUPTHER

## 2018-01-18 ENCOUNTER — OFFICE VISIT (OUTPATIENT)
Dept: PULMONOLOGY | Facility: CLINIC | Age: 73
End: 2018-01-18

## 2018-01-18 VITALS
BODY MASS INDEX: 24.77 KG/M2 | SYSTOLIC BLOOD PRESSURE: 140 MMHG | OXYGEN SATURATION: 97 % | TEMPERATURE: 98.2 F | HEART RATE: 80 BPM | HEIGHT: 70 IN | DIASTOLIC BLOOD PRESSURE: 82 MMHG | WEIGHT: 173 LBS

## 2018-01-18 DIAGNOSIS — J47.9 BRONCHIECTASIS WITHOUT COMPLICATION (HCC): Primary | Chronic | ICD-10-CM

## 2018-01-18 PROCEDURE — 94060 EVALUATION OF WHEEZING: CPT | Performed by: INTERNAL MEDICINE

## 2018-01-18 PROCEDURE — 99213 OFFICE O/P EST LOW 20 MIN: CPT | Performed by: INTERNAL MEDICINE

## 2018-01-18 NOTE — PROGRESS NOTES
CC:  F/u for bronch and MAC / Bronchiectasis    HPI:  72 y/o WM w/ h/o bronchiectasis, non-smoker, no obvious exposures, followed by my partner Dr. Dave Peter for some time now.  He had grown staph and pseudomonas on prior cultures and had been alternating abx monthly with augmentin and cipro.  Has also used Darrel nebs in the past.  More recently he developed MAC which wasn't treated until worsening x ray starting 2/25/16.  He was started on a 3x/wk regimen and has been tolerating it ok.  He is to complete 18 months of therapy through September.  Recently his CXR worsened and I performed a bronchoscopy and left sided thoracentesis 8/22/17.  Thora was c/w parapneumonic effusion, cultures from BAL brew pseudomonas resistant to quinolones.  AFB final cultures are negative for MAC after 18 months of treatment that was completed at the end of September.  Patient was placed on 4 weeks of nebulized aminoglycoside.    INTERVAL HISTORY:    Patient returns for follow up.  He was diagnosed with non-classical CF and has been to the CF clinic at  a few times and is now taking Kalydeco.  He is feeling much better.    PMH:  Non-Classical Cystic Fibrosis (mutation at p.R553 and p.B2410G)  Bronchiectasis worse in ADALBERTO > RUL  Pneumonia in 1970's admitted to  Hospital w/ cavitary pneumonia for 8 weeks (culture / skin test negative)  Recurrent Pneumonia as a child  MAC - initial growth 1/21/2014, treatment started 2/25/16 after CXR worsened   -therapy with Azithro/Ethambutol/Rifampin MWF  Prior growth of S. Aureus and Pseudomonas  COPD (non-smoker)  GERD  HTN  BPH    PSH:  T&A  Cataracts    FH:  No lung diseases    SH:  Smoked 4 years in college, basically a lifetime non-smoker.  Works as a landlord, has done some remodeling.  No major pulmonary exposures (No mold, hot tubs, pet birds, etc).    ALLERGIES:  PCN, but able to take augmentin    MEDICATIONS:  Reviewed    DIAGNOSTIC DATA (Reviewed and interpreted by me):    PFT ([facility]  only) 8/22/17 - severe obstruction    PFT - Severe Obstruction, FEV1 43%, no restriction, +air trapping, normal DLCO    PFT 1/18/18 - [facility] only, FEV1 50%, no sig change w/ BD, improved from prior    CT 1/23/2004 - bilateral upper lobe bronchiectasis L >> R    CT 8/4/2017 - bilateral upper lobe bronchiectasis L>>R, worsening in LLL sup segment, small to moderate left pleural effusion.  ADALBERTO more fibrotic compared to 2004.    CXR 8/31/17 - upper lobe predominant bronchiectasis, no change from 8/18/17    CXR 1/18/18 - improved ADALBERTO opacities    Physical Exam   Constitutional: Oriented to person, place, and time. Appears well-developed and well-nourished.   Head: Normocephalic and atraumatic.   Nose: Nose normal.   Mouth/Throat: Oropharynx is clear and moist.   Eyes: Conjunctivae are normal.   Neck: No tracheal deviation present.   Cardiovascular: Normal rate, regular rhythm, normal heart sounds and intact distal pulses.  Exam reveals no gallop and no friction rub.    No murmur heard.  Pulmonary/Chest: Effort normal and breath sounds w/ mild rhonchi. No stridor. No respiratory distress. No wheezes. No rales. No tenderness.   Abdominal: Soft. Bowel sounds are normal. No distension. No tenderness. There is no guarding.   Musculoskeletal: Normal range of motion. No edema.   Lymphadenopathy:  No cervical adenopathy.   Neurological: Alert and oriented to person, place, and time.  No Focal Neurological Deficits Observed   Skin: Skin is warm and dry. No rash noted.   Psychiatric: Normal mood and affect.  Behavior is normal. Judgment normal.     Impression & Plan    1) Bronchiectasis / CF - now following at the CF clinic.  They are managing his CF and all pulmonary related issues now.  To simplify his life he can come back and see us as needed.    RTC prmilton Laguerre MD  Pulmonology and Critical Care Medicine  01/18/18 12:59 PM  Electronically Signed

## 2018-02-02 RX ORDER — TOBRAMYCIN 28 MG/1
CAPSULE ORAL; RESPIRATORY (INHALATION)
Qty: 112 CAPSULE | Status: SHIPPED | OUTPATIENT
Start: 2018-02-02 | End: 2023-01-12 | Stop reason: ALTCHOICE

## 2018-02-03 RX ORDER — DILTIAZEM HYDROCHLORIDE 300 MG/1
CAPSULE, EXTENDED RELEASE ORAL
Qty: 90 CAPSULE | Refills: 0 | Status: SHIPPED | OUTPATIENT
Start: 2018-02-03 | End: 2018-05-01 | Stop reason: SDUPTHER

## 2018-03-21 DIAGNOSIS — G25.81 RESTLESS LEGS SYNDROME: ICD-10-CM

## 2018-03-22 RX ORDER — GABAPENTIN 100 MG/1
CAPSULE ORAL
Qty: 120 CAPSULE | Refills: 0 | OUTPATIENT
Start: 2018-03-22

## 2018-04-05 DIAGNOSIS — I10 ESSENTIAL HYPERTENSION: ICD-10-CM

## 2018-04-06 RX ORDER — HYDROCHLOROTHIAZIDE 12.5 MG/1
CAPSULE, GELATIN COATED ORAL
Qty: 90 CAPSULE | Refills: 0 | Status: SHIPPED | OUTPATIENT
Start: 2018-04-06 | End: 2018-06-11 | Stop reason: SDUPTHER

## 2018-04-16 ENCOUNTER — TELEPHONE (OUTPATIENT)
Dept: PULMONOLOGY | Facility: CLINIC | Age: 73
End: 2018-04-16

## 2018-04-16 NOTE — TELEPHONE ENCOUNTER
Spoke with patient over phone about enrollong in the Pulmonary Rehabilitation Program. He stated is is something he is interested in, but not at this time. He is being followed by the pulmonologist at  currently.

## 2018-05-01 RX ORDER — DILTIAZEM HYDROCHLORIDE 300 MG/1
CAPSULE, EXTENDED RELEASE ORAL
Qty: 90 CAPSULE | Refills: 0 | Status: SHIPPED | OUTPATIENT
Start: 2018-05-01 | End: 2018-06-11 | Stop reason: SDUPTHER

## 2018-06-11 ENCOUNTER — OFFICE VISIT (OUTPATIENT)
Dept: FAMILY MEDICINE CLINIC | Facility: CLINIC | Age: 73
End: 2018-06-11

## 2018-06-11 VITALS
TEMPERATURE: 98.6 F | RESPIRATION RATE: 18 BRPM | SYSTOLIC BLOOD PRESSURE: 132 MMHG | BODY MASS INDEX: 24.91 KG/M2 | WEIGHT: 173.6 LBS | DIASTOLIC BLOOD PRESSURE: 76 MMHG | OXYGEN SATURATION: 95 % | HEART RATE: 74 BPM

## 2018-06-11 DIAGNOSIS — J02.9 SORE THROAT: Primary | ICD-10-CM

## 2018-06-11 DIAGNOSIS — I10 ESSENTIAL HYPERTENSION: ICD-10-CM

## 2018-06-11 LAB
EXPIRATION DATE: NORMAL
INTERNAL CONTROL: NORMAL
Lab: NORMAL
S PYO AG THROAT QL: NEGATIVE

## 2018-06-11 PROCEDURE — 87880 STREP A ASSAY W/OPTIC: CPT | Performed by: FAMILY MEDICINE

## 2018-06-11 PROCEDURE — 99213 OFFICE O/P EST LOW 20 MIN: CPT | Performed by: FAMILY MEDICINE

## 2018-06-11 RX ORDER — DOXYCYCLINE 100 MG/1
100 CAPSULE ORAL 2 TIMES DAILY
Qty: 20 CAPSULE | Refills: 0 | Status: SHIPPED | OUTPATIENT
Start: 2018-06-11 | End: 2018-09-18

## 2018-06-11 RX ORDER — HYDROCHLOROTHIAZIDE 12.5 MG/1
12.5 CAPSULE, GELATIN COATED ORAL EVERY MORNING
Qty: 90 CAPSULE | Refills: 1 | Status: SHIPPED | OUTPATIENT
Start: 2018-06-11 | End: 2018-12-05 | Stop reason: SDUPTHER

## 2018-06-11 RX ORDER — DILTIAZEM HYDROCHLORIDE 300 MG/1
300 CAPSULE, EXTENDED RELEASE ORAL EVERY MORNING
Qty: 90 CAPSULE | Refills: 1 | Status: SHIPPED | OUTPATIENT
Start: 2018-06-11 | End: 2019-02-11 | Stop reason: SDUPTHER

## 2018-06-11 NOTE — PROGRESS NOTES
Subjective   Ramírez Hinds is a 72 y.o. male.     History of Present Illness   Sore throat and scratchy voice two days. No fever.  Chest congestion. Took no medication. Rested poor.    Past bronchectiasis   The following portions of the patient's history were reviewed and updated as appropriate: allergies, current medications, past family history, past medical history, past social history, past surgical history and problem list.    Review of Systems   Constitutional: Negative for fever.   HENT: Positive for congestion and sore throat.    Respiratory: Positive for cough. Negative for shortness of breath.    Cardiovascular: Negative for chest pain.       Objective   Physical Exam   Constitutional: He appears well-developed.   HENT:   Raspy voice, head congestion.  Throat mild red.   Neck: Neck supple.   Pulmonary/Chest: He has rhonchi in the right middle field.   Lymphadenopathy:     He has no cervical adenopathy.   Vitals reviewed.        Assessment/Plan   Ramírez was seen today for sore throat.    Diagnoses and all orders for this visit:    Sore throat  -     POCT rapid strep A  -     doxycycline (MONODOX) 100 MG capsule; Take 1 capsule by mouth 2 (Two) Times a Day.    Essential hypertension  -     hydrochlorothiazide (MICROZIDE) 12.5 MG capsule; Take 1 capsule by mouth Every Morning.  -     diltiaZEM (TIAZAC) 300 MG 24 hr capsule; Take 1 capsule by mouth Every Morning.

## 2018-07-17 RX ORDER — OMEPRAZOLE 40 MG/1
CAPSULE, DELAYED RELEASE ORAL
Qty: 30 CAPSULE | Refills: 0 | Status: SHIPPED | OUTPATIENT
Start: 2018-07-17 | End: 2018-08-16 | Stop reason: SDUPTHER

## 2018-07-30 RX ORDER — DILTIAZEM HYDROCHLORIDE 300 MG/1
CAPSULE, EXTENDED RELEASE ORAL
Qty: 90 CAPSULE | Refills: 0 | Status: SHIPPED | OUTPATIENT
Start: 2018-07-30 | End: 2018-10-17 | Stop reason: SDUPTHER

## 2018-08-04 DIAGNOSIS — G25.81 RESTLESS LEGS SYNDROME: ICD-10-CM

## 2018-08-07 RX ORDER — GABAPENTIN 100 MG/1
CAPSULE ORAL
Qty: 120 CAPSULE | Refills: 0 | OUTPATIENT
Start: 2018-08-07

## 2018-08-08 DIAGNOSIS — G25.81 RESTLESS LEGS SYNDROME: ICD-10-CM

## 2018-08-09 RX ORDER — GABAPENTIN 100 MG/1
CAPSULE ORAL
Qty: 120 CAPSULE | Refills: 0 | OUTPATIENT
Start: 2018-08-09

## 2018-08-16 RX ORDER — OMEPRAZOLE 40 MG/1
CAPSULE, DELAYED RELEASE ORAL
Qty: 30 CAPSULE | Refills: 1 | Status: SHIPPED | OUTPATIENT
Start: 2018-08-16 | End: 2018-10-28 | Stop reason: SDUPTHER

## 2018-09-18 ENCOUNTER — OFFICE VISIT (OUTPATIENT)
Dept: FAMILY MEDICINE CLINIC | Facility: CLINIC | Age: 73
End: 2018-09-18

## 2018-09-18 VITALS
WEIGHT: 176.8 LBS | HEIGHT: 70 IN | DIASTOLIC BLOOD PRESSURE: 77 MMHG | TEMPERATURE: 98.3 F | SYSTOLIC BLOOD PRESSURE: 148 MMHG | BODY MASS INDEX: 25.31 KG/M2 | HEART RATE: 77 BPM | OXYGEN SATURATION: 97 % | RESPIRATION RATE: 20 BRPM

## 2018-09-18 DIAGNOSIS — G25.81 RESTLESS LEGS SYNDROME: ICD-10-CM

## 2018-09-18 DIAGNOSIS — J40 BRONCHITIS: Primary | ICD-10-CM

## 2018-09-18 PROCEDURE — 99213 OFFICE O/P EST LOW 20 MIN: CPT | Performed by: FAMILY MEDICINE

## 2018-09-18 RX ORDER — GABAPENTIN 100 MG/1
100 CAPSULE ORAL 4 TIMES DAILY
Qty: 120 CAPSULE | Refills: 2 | Status: SHIPPED | OUTPATIENT
Start: 2018-09-18 | End: 2020-01-21

## 2018-09-18 RX ORDER — AMOXICILLIN AND CLAVULANATE POTASSIUM 875; 125 MG/1; MG/1
1 TABLET, FILM COATED ORAL 2 TIMES DAILY
Qty: 20 TABLET | Refills: 0 | Status: SHIPPED | OUTPATIENT
Start: 2018-09-18 | End: 2018-09-28

## 2018-09-18 NOTE — PROGRESS NOTES
Subjective   Ramírez Hinds is a 72 y.o. male.     History of Present Illness   Two days ago chills and temp to 101, some left chest tightness and slight cough.  Took no medicine. Used albuterol inhaler.  Sleeping OK. Tires easily.  Finished antibiotics, uses tobramycin inhaler each two months.  On cystic fibrosis medication schedule.  About one month ago treated at  Clinic with Levaquin for chest congestion.  The following portions of the patient's history were reviewed and updated as appropriate: allergies, current medications, past family history, past medical history, past social history, past surgical history and problem list.    Review of Systems   Constitutional: Positive for chills and fever.   Respiratory: Positive for cough and chest tightness. Negative for shortness of breath.    Cardiovascular: Negative for chest pain.   Gastrointestinal: Negative for abdominal pain.       Objective   Physical Exam   Constitutional: He appears well-developed.   Neck: Neck supple.   Cardiovascular: Regular rhythm.    Pulmonary/Chest: He has rhonchi in the right middle field.   Vitals reviewed.        Assessment/Plan   Ramírez was seen today for uri.    Diagnoses and all orders for this visit:    Bronchitis  -     amoxicillin-clavulanate (AUGMENTIN) 875-125 MG per tablet; Take 1 tablet by mouth 2 (Two) Times a Day for 10 days. Take one twice a day with food

## 2018-10-06 DIAGNOSIS — G25.81 RESTLESS LEGS SYNDROME: ICD-10-CM

## 2018-10-08 RX ORDER — GABAPENTIN 100 MG/1
CAPSULE ORAL
Qty: 120 CAPSULE | Refills: 0 | OUTPATIENT
Start: 2018-10-08

## 2018-10-17 ENCOUNTER — OFFICE VISIT (OUTPATIENT)
Dept: FAMILY MEDICINE CLINIC | Facility: CLINIC | Age: 73
End: 2018-10-17

## 2018-10-17 VITALS
BODY MASS INDEX: 25.05 KG/M2 | SYSTOLIC BLOOD PRESSURE: 112 MMHG | WEIGHT: 175 LBS | HEIGHT: 70 IN | OXYGEN SATURATION: 98 % | HEART RATE: 72 BPM | DIASTOLIC BLOOD PRESSURE: 72 MMHG | TEMPERATURE: 98.6 F

## 2018-10-17 DIAGNOSIS — R68.89 FLU-LIKE SYMPTOMS: ICD-10-CM

## 2018-10-17 DIAGNOSIS — J40 BRONCHITIS: Primary | ICD-10-CM

## 2018-10-17 LAB
EXPIRATION DATE: NORMAL
FLUAV AG NPH QL: NEGATIVE
FLUBV AG NPH QL: NEGATIVE
INTERNAL CONTROL: NORMAL
Lab: NORMAL

## 2018-10-17 PROCEDURE — 87804 INFLUENZA ASSAY W/OPTIC: CPT | Performed by: FAMILY MEDICINE

## 2018-10-17 PROCEDURE — 99213 OFFICE O/P EST LOW 20 MIN: CPT | Performed by: FAMILY MEDICINE

## 2018-10-17 RX ORDER — CEFUROXIME AXETIL 250 MG/1
250 TABLET ORAL 2 TIMES DAILY
Qty: 20 TABLET | Refills: 0 | Status: SHIPPED | OUTPATIENT
Start: 2018-10-17 | End: 2018-12-20

## 2018-10-17 NOTE — PROGRESS NOTES
Subjective   Ramírez Hinds is a 72 y.o. male.     History of Present Illness   Three days of feeling tired, sweaty ant night and fever yesterday to 101. Chest not painful. Winded with minimal activity.  Difficult to get out of bed yesterday. No dysuria.  Finished MAC course of medication.  No longer rotating antibiotics each three weeks.  No flu shot.   The following portions of the patient's history were reviewed and updated as appropriate: allergies, current medications, past family history, past medical history, past social history, past surgical history and problem list.    Review of Systems   HENT: Negative for congestion.    Respiratory: Positive for chest tightness and shortness of breath.    Gastrointestinal: Positive for nausea.       Objective   Physical Exam   Constitutional: He appears well-developed.   HENT:   Mouth/Throat: Oropharynx is clear and moist.   Neck: Neck supple.   Pulmonary/Chest: He has decreased breath sounds in the right lower field.   Long inspiratory phase.   Lymphadenopathy:     He has no cervical adenopathy.   Vitals reviewed.        Assessment/Plan   Ramírez was seen today for fever, fatigue and nausea.    Diagnoses and all orders for this visit:    Bronchitis  -     cefuroxime (CEFTIN) 250 MG tablet; Take 1 tablet by mouth 2 (Two) Times a Day.    Flu-like symptoms  -     POCT Influenza A/B

## 2018-10-29 RX ORDER — OMEPRAZOLE 40 MG/1
CAPSULE, DELAYED RELEASE ORAL
Qty: 30 CAPSULE | Refills: 0 | Status: SHIPPED | OUTPATIENT
Start: 2018-10-29 | End: 2018-11-24 | Stop reason: SDUPTHER

## 2018-11-26 RX ORDER — OMEPRAZOLE 40 MG/1
CAPSULE, DELAYED RELEASE ORAL
Qty: 30 CAPSULE | Refills: 0 | Status: SHIPPED | OUTPATIENT
Start: 2018-11-26 | End: 2018-12-23 | Stop reason: SDUPTHER

## 2018-11-29 ENCOUNTER — OFFICE VISIT (OUTPATIENT)
Dept: FAMILY MEDICINE CLINIC | Facility: CLINIC | Age: 73
End: 2018-11-29

## 2018-11-29 VITALS
BODY MASS INDEX: 25.68 KG/M2 | HEART RATE: 72 BPM | SYSTOLIC BLOOD PRESSURE: 170 MMHG | DIASTOLIC BLOOD PRESSURE: 90 MMHG | TEMPERATURE: 98 F | RESPIRATION RATE: 16 BRPM | WEIGHT: 179 LBS | OXYGEN SATURATION: 96 %

## 2018-11-29 DIAGNOSIS — I10 ESSENTIAL HYPERTENSION: Primary | ICD-10-CM

## 2018-11-29 DIAGNOSIS — Z12.5 PROSTATE CANCER SCREENING: ICD-10-CM

## 2018-11-29 LAB
ALBUMIN SERPL-MCNC: 4.14 G/DL (ref 3.2–4.8)
ALBUMIN/GLOB SERPL: 1.4 G/DL (ref 1.5–2.5)
ALP SERPL-CCNC: 92 U/L (ref 25–100)
ALT SERPL W P-5'-P-CCNC: 23 U/L (ref 7–40)
ANION GAP SERPL CALCULATED.3IONS-SCNC: 8 MMOL/L (ref 3–11)
AST SERPL-CCNC: 21 U/L (ref 0–33)
BASOPHILS # BLD AUTO: 0.01 10*3/MM3 (ref 0–0.2)
BASOPHILS NFR BLD AUTO: 0.1 % (ref 0–1)
BILIRUB SERPL-MCNC: 0.3 MG/DL (ref 0.3–1.2)
BUN BLD-MCNC: 30 MG/DL (ref 9–23)
BUN/CREAT SERPL: 30.3 (ref 7–25)
CALCIUM SPEC-SCNC: 9.3 MG/DL (ref 8.7–10.4)
CHLORIDE SERPL-SCNC: 98 MMOL/L (ref 99–109)
CO2 SERPL-SCNC: 29 MMOL/L (ref 20–31)
CREAT BLD-MCNC: 0.99 MG/DL (ref 0.6–1.3)
DEPRECATED RDW RBC AUTO: 45.6 FL (ref 37–54)
EOSINOPHIL # BLD AUTO: 0.02 10*3/MM3 (ref 0–0.3)
EOSINOPHIL NFR BLD AUTO: 0.1 % (ref 0–3)
ERYTHROCYTE [DISTWIDTH] IN BLOOD BY AUTOMATED COUNT: 13.7 % (ref 11.3–14.5)
GFR SERPL CREATININE-BSD FRML MDRD: 74 ML/MIN/1.73
GLOBULIN UR ELPH-MCNC: 3.1 GM/DL
GLUCOSE BLD-MCNC: 137 MG/DL (ref 70–100)
HCT VFR BLD AUTO: 42.6 % (ref 38.9–50.9)
HGB BLD-MCNC: 13.5 G/DL (ref 13.1–17.5)
IMM GRANULOCYTES # BLD: 0.07 10*3/MM3 (ref 0–0.03)
IMM GRANULOCYTES NFR BLD: 0.4 % (ref 0–0.6)
LYMPHOCYTES # BLD AUTO: 1.53 10*3/MM3 (ref 0.6–4.8)
LYMPHOCYTES NFR BLD AUTO: 8.5 % (ref 24–44)
MAGNESIUM SERPL-MCNC: 2.3 MG/DL (ref 1.3–2.7)
MCH RBC QN AUTO: 28.6 PG (ref 27–31)
MCHC RBC AUTO-ENTMCNC: 31.7 G/DL (ref 32–36)
MCV RBC AUTO: 90.3 FL (ref 80–99)
MONOCYTES # BLD AUTO: 0.98 10*3/MM3 (ref 0–1)
MONOCYTES NFR BLD AUTO: 5.4 % (ref 0–12)
NEUTROPHILS # BLD AUTO: 15.38 10*3/MM3 (ref 1.5–8.3)
NEUTROPHILS NFR BLD AUTO: 85.5 % (ref 41–71)
PLATELET # BLD AUTO: 416 10*3/MM3 (ref 150–450)
PMV BLD AUTO: 9.4 FL (ref 6–12)
POTASSIUM BLD-SCNC: 4 MMOL/L (ref 3.5–5.5)
PROT SERPL-MCNC: 7.2 G/DL (ref 5.7–8.2)
PSA SERPL-MCNC: 3.01 NG/ML (ref 0–4)
RBC # BLD AUTO: 4.72 10*6/MM3 (ref 4.2–5.76)
SODIUM BLD-SCNC: 135 MMOL/L (ref 132–146)
TSH SERPL DL<=0.05 MIU/L-ACNC: 2.62 MIU/ML (ref 0.35–5.35)
WBC NRBC COR # BLD: 17.99 10*3/MM3 (ref 3.5–10.8)

## 2018-11-29 PROCEDURE — 83735 ASSAY OF MAGNESIUM: CPT | Performed by: FAMILY MEDICINE

## 2018-11-29 PROCEDURE — 80053 COMPREHEN METABOLIC PANEL: CPT | Performed by: FAMILY MEDICINE

## 2018-11-29 PROCEDURE — 36415 COLL VENOUS BLD VENIPUNCTURE: CPT | Performed by: FAMILY MEDICINE

## 2018-11-29 PROCEDURE — 85025 COMPLETE CBC W/AUTO DIFF WBC: CPT | Performed by: FAMILY MEDICINE

## 2018-11-29 PROCEDURE — 84443 ASSAY THYROID STIM HORMONE: CPT | Performed by: FAMILY MEDICINE

## 2018-11-29 PROCEDURE — G0103 PSA SCREENING: HCPCS | Performed by: FAMILY MEDICINE

## 2018-11-29 PROCEDURE — 99213 OFFICE O/P EST LOW 20 MIN: CPT | Performed by: FAMILY MEDICINE

## 2018-11-29 NOTE — PROGRESS NOTES
Subjective   Ramírez Hinds is a 73 y.o. male.     History of Present Illness   Seen [facility] four days ago noticed BP high 188/ treated with Cipro for bronchitis.  Home pressure staying above 160/ .   No swelling, voiding more often.  Some headache. Come dyspnea with activity.  Leg pain at night.  The following portions of the patient's history were reviewed and updated as appropriate: allergies, current medications, past family history, past medical history, past social history, past surgical history and problem list.    Review of Systems   Constitutional: Negative for activity change and fever.   HENT: Negative for congestion.    Respiratory: Positive for shortness of breath and wheezing.    Cardiovascular: Negative for chest pain and leg swelling.   Genitourinary: Positive for frequency.       Objective   Physical Exam   Constitutional: He appears well-developed.   Neck: Neck supple. Carotid bruit is not present.   Cardiovascular: Normal heart sounds.   No murmur heard.  Pulmonary/Chest: He has wheezes.   Long inspiratory phase   Abdominal: He exhibits no distension.   Vitals reviewed.      Assessment/Plan   Ramírez was seen today for hypertension.    Diagnoses and all orders for this visit:    Essential hypertension  -     CBC & Differential  -     Comprehensive Metabolic Panel  -     TSH  -     Magnesium  -     Azilsartan Medoxomil (EDARBI) 40 MG tablet; Take 40 mg by mouth Daily.    Prostate cancer screening  -     PSA Screen      Keep home log of BP

## 2018-12-01 ENCOUNTER — HOSPITAL ENCOUNTER (EMERGENCY)
Facility: HOSPITAL | Age: 73
Discharge: HOME | End: 2018-12-02
Attending: EMERGENCY MEDICINE | Admitting: EMERGENCY MEDICINE

## 2018-12-01 DIAGNOSIS — I10 UNCONTROLLED HYPERTENSION: Primary | ICD-10-CM

## 2018-12-01 PROCEDURE — 85025 COMPLETE CBC W/AUTO DIFF WBC: CPT | Performed by: NURSE PRACTITIONER

## 2018-12-01 PROCEDURE — 99284 EMERGENCY DEPT VISIT MOD MDM: CPT

## 2018-12-01 RX ORDER — SODIUM CHLORIDE 0.9 % (FLUSH) 0.9 %
10 SYRINGE (ML) INJECTION AS NEEDED
Status: DISCONTINUED | OUTPATIENT
Start: 2018-12-01 | End: 2018-12-02 | Stop reason: HOSPADM

## 2018-12-02 VITALS
SYSTOLIC BLOOD PRESSURE: 140 MMHG | TEMPERATURE: 98.1 F | WEIGHT: 170 LBS | DIASTOLIC BLOOD PRESSURE: 74 MMHG | HEART RATE: 69 BPM | OXYGEN SATURATION: 92 % | BODY MASS INDEX: 23.8 KG/M2 | RESPIRATION RATE: 16 BRPM | HEIGHT: 71 IN

## 2018-12-02 LAB
ALBUMIN SERPL-MCNC: 4.26 G/DL (ref 3.2–4.8)
ALBUMIN/GLOB SERPL: 1.3 G/DL (ref 1.5–2.5)
ALP SERPL-CCNC: 85 U/L (ref 25–100)
ALT SERPL W P-5'-P-CCNC: 32 U/L (ref 7–40)
ANION GAP SERPL CALCULATED.3IONS-SCNC: 8 MMOL/L (ref 3–11)
AST SERPL-CCNC: 26 U/L (ref 0–33)
BASOPHILS # BLD AUTO: 0.08 10*3/MM3 (ref 0–0.2)
BASOPHILS NFR BLD AUTO: 0.5 % (ref 0–1)
BILIRUB SERPL-MCNC: 0.4 MG/DL (ref 0.3–1.2)
BUN BLD-MCNC: 25 MG/DL (ref 9–23)
BUN/CREAT SERPL: 24 (ref 7–25)
CALCIUM SPEC-SCNC: 9 MG/DL (ref 8.7–10.4)
CHLORIDE SERPL-SCNC: 99 MMOL/L (ref 99–109)
CO2 SERPL-SCNC: 31 MMOL/L (ref 20–31)
CREAT BLD-MCNC: 1.04 MG/DL (ref 0.6–1.3)
DEPRECATED RDW RBC AUTO: 45.6 FL (ref 37–54)
EOSINOPHIL # BLD AUTO: 0.32 10*3/MM3 (ref 0–0.3)
EOSINOPHIL NFR BLD AUTO: 1.8 % (ref 0–3)
ERYTHROCYTE [DISTWIDTH] IN BLOOD BY AUTOMATED COUNT: 13.9 % (ref 11.3–14.5)
GFR SERPL CREATININE-BSD FRML MDRD: 70 ML/MIN/1.73
GLOBULIN UR ELPH-MCNC: 3.3 GM/DL
GLUCOSE BLD-MCNC: 86 MG/DL (ref 70–100)
HCT VFR BLD AUTO: 43 % (ref 38.9–50.9)
HGB BLD-MCNC: 14.1 G/DL (ref 13.1–17.5)
HOLD SPECIMEN: NORMAL
HOLD SPECIMEN: NORMAL
IMM GRANULOCYTES # BLD: 0.09 10*3/MM3 (ref 0–0.03)
IMM GRANULOCYTES NFR BLD: 0.5 % (ref 0–0.6)
LYMPHOCYTES # BLD AUTO: 5.01 10*3/MM3 (ref 0.6–4.8)
LYMPHOCYTES NFR BLD AUTO: 29 % (ref 24–44)
MCH RBC QN AUTO: 29.6 PG (ref 27–31)
MCHC RBC AUTO-ENTMCNC: 32.8 G/DL (ref 32–36)
MCV RBC AUTO: 90.3 FL (ref 80–99)
MONOCYTES # BLD AUTO: 1.85 10*3/MM3 (ref 0–1)
MONOCYTES NFR BLD AUTO: 10.7 % (ref 0–12)
NEUTROPHILS # BLD AUTO: 10.04 10*3/MM3 (ref 1.5–8.3)
NEUTROPHILS NFR BLD AUTO: 58 % (ref 41–71)
PLATELET # BLD AUTO: 394 10*3/MM3 (ref 150–450)
PMV BLD AUTO: 9.6 FL (ref 6–12)
POTASSIUM BLD-SCNC: 3.9 MMOL/L (ref 3.5–5.5)
PROT SERPL-MCNC: 7.6 G/DL (ref 5.7–8.2)
RBC # BLD AUTO: 4.76 10*6/MM3 (ref 4.2–5.76)
SODIUM BLD-SCNC: 138 MMOL/L (ref 132–146)
TROPONIN I SERPL-MCNC: 0.01 NG/ML
WBC NRBC COR # BLD: 17.3 10*3/MM3 (ref 3.5–10.8)
WHOLE BLOOD HOLD SPECIMEN: NORMAL
WHOLE BLOOD HOLD SPECIMEN: NORMAL

## 2018-12-02 PROCEDURE — 80053 COMPREHEN METABOLIC PANEL: CPT | Performed by: NURSE PRACTITIONER

## 2018-12-02 PROCEDURE — 84484 ASSAY OF TROPONIN QUANT: CPT | Performed by: NURSE PRACTITIONER

## 2018-12-02 PROCEDURE — 93005 ELECTROCARDIOGRAM TRACING: CPT | Performed by: NURSE PRACTITIONER

## 2018-12-02 RX ORDER — SODIUM CHLORIDE 0.9 % (FLUSH) 0.9 %
10 SYRINGE (ML) INJECTION AS NEEDED
Status: DISCONTINUED | OUTPATIENT
Start: 2018-12-02 | End: 2018-12-02 | Stop reason: HOSPADM

## 2018-12-02 NOTE — ED PROVIDER NOTES
Subjective   Ramírez Hinds is a 73 y.o.male with a history of hypertension who presents to the emergency department with complaints of elevated blood pressure readings (greater than 200 systolic) over the last few days. He was seen by primary care for this two days ago and was given a med called Edarbi although today his blood pressure was as high as 230 systolic. Pt has been taking Diltiazem, HCTZ.  Pt is unsure if he was supposed to continue the Diltiazem and HCTZ.  He denies chest pain, shortness of breath, dizziness, headache, or blurred vision. There are no other acute complaints at this time.        History provided by:  Patient  Hypertension   Severity:  Moderate  Onset quality:  Unable to specify  Duration: several days.  Timing:  Constant  Progression:  Unchanged  Chronicity:  New  Notable PTA blood pressures:  230 systolic  Context: medication change    Relieved by:  Nothing  Worsened by:  Nothing  Ineffective treatments:  Angiotensin blockers  Associated symptoms: no blurred vision, no chest pain, no dizziness, no headaches, no nausea, no shortness of breath and not vomiting        Review of Systems   Eyes: Negative for blurred vision and visual disturbance.   Respiratory: Negative for shortness of breath.    Cardiovascular: Negative for chest pain.   Gastrointestinal: Negative for nausea and vomiting.   Neurological: Negative for dizziness and headaches.   All other systems reviewed and are negative.      Past Medical History:   Diagnosis Date   • Anxiety    • Bronchiectasis (CMS/HCC)     A.  Cystic saccular bronchiectasis, left upper lobe as well as lesser disease in right upper lobe and left low lobe.   • Folliculitis    • Fracture, carpal bone     Right   • GERD (gastroesophageal reflux disease)    • History of chest x-ray 01/25/2016    Heart size small and vertical. Mod elevation of the RT hemidiaphragm. LT PM artery is retracted superiorly and associated w/ reticulonodular increase in markings in the  "ADALBERTO.Also some fibrotic changes and RT apical capping in the RT apex, although not nearly as sig.Film very similar to prior study of 11/16/2010, amny dif explained by fact that today's film is light in comparison to one from 2010   • History of chest x-ray 03/21/2011    Questionable progressive fibronodular disease in the LT chest w/ new elevation of the RT hemidiaphragm.Lateral fim does not show sig elevation of the diaphragms.   • History of chest x-ray 10/22/2009    Diaphragms flattened, especially on LT.Lungs hyperinflated.Retraction of PM artery superiorly on LT.BTL fibronodular disease in the apices which appears chonic. Much > severe on LT and there is BTL apical capping.All appears chronic although no old film for comparison   • History of peptic ulcer    • History of PFTs 01/25/2016    Mod-Severe OAD w/ a drop in FEV1 from 1.92 L to 1.55L   • History of PFTs 06/01/2015    Data is acceptable and reproducible.Given 3 puffs of xopenex. Gave a good effort. Pre Ref FVC 73%, FEV1 56%   • History of PFTs 07/29/2014    Mod Obstructive lung disease.Improved since 1/2014 (1.6 to 1.82 L)   • History of pneumonia      History of previous pneumonias and \" lung cavities\".   • History of scarlet fever    • Hypertension    • Mycobacterium avium complex (CMS/HCC)     A.  Cystic saccular bronchiectasis, left upper lobe as well as lesser disease in right upper lobe and left low lobe.   • Nausea     Also has lost 9 pounds, some of which is due to some low-grade nausea    • Neuritis    • Peptic ulcer    • Pneumonia    • Pseudomonas aeruginosa infection     Patient's sputum of 06/02/2015 is positive for pseudomonas aeruginosa that is resistant to Levaquin and patient is allergic to penicillin, patient started on tobramycin nebulizer treatments for 10 days.   • Pulmonary nodule     Small spiculated right apical nodule stable on CT scan 02/27/2015 when compared to prior studies dating back to 01/23/2004.   • Scarlet fever    • " Weakness      Has noted some weakness and malaise since on therapy.       Allergies   Allergen Reactions   • Horse-Derived Products        Past Surgical History:   Procedure Laterality Date   • CATARACT EXTRACTION     • EYE SURGERY     • TONSILLECTOMY AND ADENOIDECTOMY         Family History   Problem Relation Age of Onset   • Alzheimer's disease Mother    • Heart attack Father         MI   • Other Father         Hx of CABG   • No Known Problems Sister    • No Known Problems Brother    • Diabetes Other        Social History     Socioeconomic History   • Marital status:      Spouse name: Not on file   • Number of children: Not on file   • Years of education: Not on file   • Highest education level: Not on file   Tobacco Use   • Smoking status: Former Smoker   • Smokeless tobacco: Never Used   • Tobacco comment: Smoked briefly in college.   Substance and Sexual Activity   • Alcohol use: Yes     Comment: Occasional/Social use   • Drug use: No         Objective   Physical Exam   Constitutional: He is oriented to person, place, and time. He appears well-developed and well-nourished. No distress.   HENT:   Head: Normocephalic and atraumatic.   Right Ear: External ear normal.   Left Ear: External ear normal.   Mouth/Throat: Oropharynx is clear and moist. No oropharyngeal exudate.   Eyes: Conjunctivae and EOM are normal. Pupils are equal, round, and reactive to light. No scleral icterus.   Neck: Normal range of motion. Neck supple.   Cardiovascular: Normal rate, regular rhythm and normal heart sounds.   No murmur heard.  Pulmonary/Chest: Effort normal and breath sounds normal. No respiratory distress.   Abdominal: Soft. Bowel sounds are normal. There is no tenderness.   Musculoskeletal: Normal range of motion. He exhibits no edema.   Neurological: He is alert and oriented to person, place, and time. No cranial nerve deficit. Coordination normal.   Skin: Skin is warm and dry. No erythema.   Psychiatric: He has a  normal mood and affect. His behavior is normal.   Nursing note and vitals reviewed.      Procedures         ED Course  ED Course as of Dec 02 1945   Sun Dec 02, 2018   0141 Pt BP decreased to 140/74 with out any medications administered in the ER.  Pt continues to deny any complaints at this time. Pt will be dc home. Pt to continue his current meds and call PCP in AM.  Pt agrees and verb understanding.    [KG]      ED Course User Index  [KG] Laura Aj, CARLO     Recent Results (from the past 24 hour(s))   Light Blue Top    Collection Time: 12/01/18 11:54 PM   Result Value Ref Range    Extra Tube hold for add-on    Green Top (Gel)    Collection Time: 12/01/18 11:54 PM   Result Value Ref Range    Extra Tube Hold for add-ons.    Lavender Top    Collection Time: 12/01/18 11:54 PM   Result Value Ref Range    Extra Tube hold for add-on    Gold Top - SST    Collection Time: 12/01/18 11:54 PM   Result Value Ref Range    Extra Tube Hold for add-ons.    CBC Auto Differential    Collection Time: 12/01/18 11:54 PM   Result Value Ref Range    WBC 17.30 (H) 3.50 - 10.80 10*3/mm3    RBC 4.76 4.20 - 5.76 10*6/mm3    Hemoglobin 14.1 13.1 - 17.5 g/dL    Hematocrit 43.0 38.9 - 50.9 %    MCV 90.3 80.0 - 99.0 fL    MCH 29.6 27.0 - 31.0 pg    MCHC 32.8 32.0 - 36.0 g/dL    RDW 13.9 11.3 - 14.5 %    RDW-SD 45.6 37.0 - 54.0 fl    MPV 9.6 6.0 - 12.0 fL    Platelets 394 150 - 450 10*3/mm3    Neutrophil % 58.0 41.0 - 71.0 %    Lymphocyte % 29.0 24.0 - 44.0 %    Monocyte % 10.7 0.0 - 12.0 %    Eosinophil % 1.8 0.0 - 3.0 %    Basophil % 0.5 0.0 - 1.0 %    Immature Grans % 0.5 0.0 - 0.6 %    Neutrophils, Absolute 10.04 (H) 1.50 - 8.30 10*3/mm3    Lymphocytes, Absolute 5.01 (H) 0.60 - 4.80 10*3/mm3    Monocytes, Absolute 1.85 (H) 0.00 - 1.00 10*3/mm3    Eosinophils, Absolute 0.32 (H) 0.00 - 0.30 10*3/mm3    Basophils, Absolute 0.08 0.00 - 0.20 10*3/mm3    Immature Grans, Absolute 0.09 (H) 0.00 - 0.03 10*3/mm3   Comprehensive Metabolic  Panel    Collection Time: 12/02/18 12:01 AM   Result Value Ref Range    Glucose 86 70 - 100 mg/dL    BUN 25 (H) 9 - 23 mg/dL    Creatinine 1.04 0.60 - 1.30 mg/dL    Sodium 138 132 - 146 mmol/L    Potassium 3.9 3.5 - 5.5 mmol/L    Chloride 99 99 - 109 mmol/L    CO2 31.0 20.0 - 31.0 mmol/L    Calcium 9.0 8.7 - 10.4 mg/dL    Total Protein 7.6 5.7 - 8.2 g/dL    Albumin 4.26 3.20 - 4.80 g/dL    ALT (SGPT) 32 7 - 40 U/L    AST (SGOT) 26 0 - 33 U/L    Alkaline Phosphatase 85 25 - 100 U/L    Total Bilirubin 0.4 0.3 - 1.2 mg/dL    eGFR Non African Amer 70 >60 mL/min/1.73    Globulin 3.3 gm/dL    A/G Ratio 1.3 (L) 1.5 - 2.5 g/dL    BUN/Creatinine Ratio 24.0 7.0 - 25.0    Anion Gap 8.0 3.0 - 11.0 mmol/L   Troponin    Collection Time: 12/02/18 12:20 AM   Result Value Ref Range    Troponin I 0.008 <=0.039 ng/mL     Note: In addition to lab results from this visit, the labs listed above may include labs taken at another facility or during a different encounter within the last 24 hours. Please correlate lab times with ED admission and discharge times for further clarification of the services performed during this visit.    No orders to display     Vitals:    12/02/18 0100 12/02/18 0115 12/02/18 0130 12/02/18 0145   BP: 146/81 138/78 134/72 140/74   Pulse: 72 73 74 69   Resp:       Temp:       TempSrc:       SpO2: 92% 91% 90% 92%   Weight:       Height:         Medications - No data to display  ECG/EMG Results (last 24 hours)     ** No results found for the last 24 hours. **                       MDM    Final diagnoses:   Uncontrolled hypertension       Documentation assistance provided by shad Singh.  Information recorded by the christopheibdeanna was done at my direction and has been verified and validated by me.     Vivi Singh  12/02/18 0022       Laura Aj APRN  12/1945

## 2018-12-03 ENCOUNTER — TELEPHONE (OUTPATIENT)
Dept: FAMILY MEDICINE CLINIC | Facility: CLINIC | Age: 73
End: 2018-12-03

## 2018-12-03 NOTE — TELEPHONE ENCOUNTER
----- Message from Jacqueline Garcia sent at 12/3/2018  9:46 AM EST -----  Regarding: PLEASE CALL ABOUT BP MEDS TODAY  Contact: [phone]  NEEDS TO KNOW IF HE TAKES HIS BLOOD PRESSURE MEDICINE THAT DR. HO GAVE HIM LAST WEEK WITH HIS OTHER BP MEDS OR STOP THE OTHER MEDICATION    Per Dr. Ho pt is to take new bp med along with current bp med.  BBALISHA saavedra

## 2018-12-03 NOTE — TELEPHONE ENCOUNTER
----- Message from Jacqueline Garcia sent at 12/3/2018  9:46 AM EST -----  Regarding: PLEASE CALL ABOUT BP MEDS TODAY  Contact: [phone]  NEEDS TO KNOW IF HE TAKES HIS BLOOD PRESSURE MEDICINE THAT DR. HO GAVE HIM LAST WEEK WITH HIS OTHER BP MEDS OR STOP THE OTHER MEDICATION    Per Dr. Ho pt is to take new bp med along with current bp med.  BBALISHA saavedra

## 2018-12-05 DIAGNOSIS — I10 ESSENTIAL HYPERTENSION: ICD-10-CM

## 2018-12-05 RX ORDER — HYDROCHLOROTHIAZIDE 12.5 MG/1
12.5 CAPSULE, GELATIN COATED ORAL EVERY MORNING
Qty: 90 CAPSULE | Refills: 0 | Status: SHIPPED | OUTPATIENT
Start: 2018-12-05 | End: 2019-05-13 | Stop reason: SDUPTHER

## 2018-12-05 NOTE — TELEPHONE ENCOUNTER
----- Message from Dahiana Fajardo Rep sent at 12/5/2018 10:21 AM EST -----  Regarding: RX SIDE EFFECTS  Contact: [phone]  PT WOULD LIKE TO KNOW IF ANY OF HIS MEDS WILL CAUSE EXTRA BLEEDING WHILE HAVING A DENTAL PROCDURE DONE?    Advised pt that bp meds would not cause excessive bleeding during procedure, advised that if taking asa should stop five days prior.  BBquentin, CMA

## 2018-12-20 ENCOUNTER — OFFICE VISIT (OUTPATIENT)
Dept: FAMILY MEDICINE CLINIC | Facility: CLINIC | Age: 73
End: 2018-12-20

## 2018-12-20 VITALS
DIASTOLIC BLOOD PRESSURE: 68 MMHG | WEIGHT: 176 LBS | OXYGEN SATURATION: 94 % | SYSTOLIC BLOOD PRESSURE: 130 MMHG | TEMPERATURE: 98.2 F | RESPIRATION RATE: 16 BRPM | BODY MASS INDEX: 24.55 KG/M2

## 2018-12-20 DIAGNOSIS — Z23 IMMUNIZATION DUE: ICD-10-CM

## 2018-12-20 DIAGNOSIS — I10 ESSENTIAL HYPERTENSION: Primary | ICD-10-CM

## 2018-12-20 PROCEDURE — 90471 IMMUNIZATION ADMIN: CPT | Performed by: FAMILY MEDICINE

## 2018-12-20 PROCEDURE — 90632 HEPA VACCINE ADULT IM: CPT | Performed by: FAMILY MEDICINE

## 2018-12-20 PROCEDURE — 90715 TDAP VACCINE 7 YRS/> IM: CPT | Performed by: FAMILY MEDICINE

## 2018-12-20 PROCEDURE — 99213 OFFICE O/P EST LOW 20 MIN: CPT | Performed by: FAMILY MEDICINE

## 2018-12-20 PROCEDURE — 90472 IMMUNIZATION ADMIN EACH ADD: CPT | Performed by: FAMILY MEDICINE

## 2018-12-20 NOTE — PROGRESS NOTES
Subjective   Ramírez Hinds is a 73 y.o. male.     History of Present Illness   Blood pressure elevated and was not taking BP combination of medication and went to ER. Started taking Edarbi with diltiazem and HCTZ and pressure went down.  No cough change, no swelling or joint stiffness.   Improved lung capacity at pulmonary.  Breathing better.   Interested in Hepatitis A vaccine.  The following portions of the patient's history were reviewed and updated as appropriate: allergies, current medications, past family history, past medical history, past social history, past surgical history and problem list.    Review of Systems   Constitutional: Negative for activity change.   HENT: Negative for congestion.    Respiratory: Negative for chest tightness and shortness of breath.    Cardiovascular: Negative for chest pain and leg swelling.   Neurological: Negative for dizziness.       Objective   Physical Exam   Constitutional: He appears well-developed.   Pulmonary/Chest:   Good exchange.   Musculoskeletal: He exhibits no edema.   Neurological: He is alert.   Vitals reviewed.      Assessment/Plan   Ramírez was seen today for hypertension.    Diagnoses and all orders for this visit:    Essential hypertension  -     Azilsartan Medoxomil (EDARBI) 40 MG tablet; Take 40 mg by mouth Daily.    Immunization due  -     Tdap Vaccine Greater Than or Equal To 6yo IM  -     hepatitis A (HAVRIX) vaccine 1,440 Units; Inject 1 mL into the appropriate muscle as directed by prescriber 1 (One) Time.

## 2018-12-21 ENCOUNTER — PRIOR AUTHORIZATION (OUTPATIENT)
Dept: FAMILY MEDICINE CLINIC | Facility: CLINIC | Age: 73
End: 2018-12-21

## 2018-12-21 ENCOUNTER — TELEPHONE (OUTPATIENT)
Dept: FAMILY MEDICINE CLINIC | Facility: CLINIC | Age: 73
End: 2018-12-21

## 2018-12-21 RX ORDER — VALSARTAN 80 MG/1
80 TABLET ORAL DAILY
Qty: 30 TABLET | Refills: 3 | Status: SHIPPED | OUTPATIENT
Start: 2018-12-21 | End: 2019-01-30 | Stop reason: DRUGHIGH

## 2018-12-21 NOTE — TELEPHONE ENCOUNTER
----- Message from Dahiana Kramer Rep sent at 12/21/2018  8:30 AM EST -----  Regarding: INS DENIED MEDICATION  Contact: [phone]   [employer] DENIED THE MEDICATION EDARBI THAT WAS PRESCRIBED CAN DR HO GIVE SOMETHING ELSE, PLEASE CALL    Rx sent in for Valsartan 80 mg  ALISHA Nunez

## 2018-12-24 RX ORDER — OMEPRAZOLE 40 MG/1
CAPSULE, DELAYED RELEASE ORAL
Qty: 30 CAPSULE | Refills: 0 | Status: SHIPPED | OUTPATIENT
Start: 2018-12-24 | End: 2019-05-04 | Stop reason: SDUPTHER

## 2019-01-30 ENCOUNTER — OFFICE VISIT (OUTPATIENT)
Dept: FAMILY MEDICINE CLINIC | Facility: CLINIC | Age: 74
End: 2019-01-30

## 2019-01-30 VITALS
DIASTOLIC BLOOD PRESSURE: 82 MMHG | RESPIRATION RATE: 16 BRPM | OXYGEN SATURATION: 96 % | SYSTOLIC BLOOD PRESSURE: 168 MMHG | BODY MASS INDEX: 25.37 KG/M2 | TEMPERATURE: 97.4 F | HEIGHT: 71 IN | HEART RATE: 67 BPM | WEIGHT: 181.2 LBS

## 2019-01-30 DIAGNOSIS — I10 ESSENTIAL HYPERTENSION: Primary | Chronic | ICD-10-CM

## 2019-01-30 DIAGNOSIS — E84.9 CYSTIC FIBROSIS (HCC): ICD-10-CM

## 2019-01-30 DIAGNOSIS — G25.81 RESTLESS LEG SYNDROME: ICD-10-CM

## 2019-01-30 PROBLEM — A49.8 PSEUDOMONAS AERUGINOSA INFECTION: Status: ACTIVE | Noted: 2019-01-30

## 2019-01-30 PROBLEM — R91.8 LUNG MASS: Status: ACTIVE | Noted: 2019-01-30

## 2019-01-30 PROBLEM — L73.9 FOLLICULITIS: Status: ACTIVE | Noted: 2019-01-30

## 2019-01-30 PROBLEM — M79.2 NEURITIS: Status: ACTIVE | Noted: 2019-01-30

## 2019-01-30 PROCEDURE — 99213 OFFICE O/P EST LOW 20 MIN: CPT | Performed by: NURSE PRACTITIONER

## 2019-01-30 RX ORDER — VALSARTAN 160 MG/1
160 TABLET ORAL DAILY
Qty: 30 TABLET | Refills: 0 | Status: SHIPPED | OUTPATIENT
Start: 2019-01-30 | End: 2019-01-31 | Stop reason: SDUPTHER

## 2019-01-30 RX ORDER — ROPINIROLE 0.5 MG/1
1.5 TABLET, FILM COATED ORAL NIGHTLY
COMMUNITY
End: 2020-01-21 | Stop reason: DRUGHIGH

## 2019-01-31 DIAGNOSIS — I10 ESSENTIAL HYPERTENSION: Chronic | ICD-10-CM

## 2019-02-11 DIAGNOSIS — I10 ESSENTIAL HYPERTENSION: ICD-10-CM

## 2019-02-11 RX ORDER — DILTIAZEM HYDROCHLORIDE 300 MG/1
300 CAPSULE, EXTENDED RELEASE ORAL EVERY MORNING
Qty: 90 CAPSULE | Refills: 1 | Status: SHIPPED | OUTPATIENT
Start: 2019-02-11 | End: 2019-10-08 | Stop reason: SDUPTHER

## 2019-02-13 RX ORDER — VALSARTAN 160 MG/1
160 TABLET ORAL DAILY
Qty: 90 TABLET | Refills: 0 | Status: SHIPPED | OUTPATIENT
Start: 2019-02-13 | End: 2019-06-14 | Stop reason: SDUPTHER

## 2019-03-27 DIAGNOSIS — J44.9 CHRONIC OBSTRUCTIVE PULMONARY DISEASE, UNSPECIFIED COPD TYPE (HCC): ICD-10-CM

## 2019-03-27 RX ORDER — ALBUTEROL SULFATE 2.5 MG/3ML
SOLUTION RESPIRATORY (INHALATION)
Qty: 360 ML | Refills: 0 | Status: SHIPPED | OUTPATIENT
Start: 2019-03-27 | End: 2019-07-17 | Stop reason: SDUPTHER

## 2019-04-30 DIAGNOSIS — J44.9 CHRONIC OBSTRUCTIVE PULMONARY DISEASE, UNSPECIFIED COPD TYPE (HCC): ICD-10-CM

## 2019-05-06 RX ORDER — OMEPRAZOLE 40 MG/1
CAPSULE, DELAYED RELEASE ORAL
Qty: 90 CAPSULE | Refills: 0 | Status: SHIPPED | OUTPATIENT
Start: 2019-05-06

## 2019-05-13 DIAGNOSIS — I10 ESSENTIAL HYPERTENSION: ICD-10-CM

## 2019-05-14 RX ORDER — HYDROCHLOROTHIAZIDE 12.5 MG/1
12.5 CAPSULE, GELATIN COATED ORAL EVERY MORNING
Qty: 90 CAPSULE | Refills: 0 | Status: SHIPPED | OUTPATIENT
Start: 2019-05-14 | End: 2019-08-09 | Stop reason: SDUPTHER

## 2019-05-14 RX ORDER — OMEPRAZOLE 40 MG/1
CAPSULE, DELAYED RELEASE ORAL
Qty: 30 CAPSULE | Refills: 0 | Status: SHIPPED | OUTPATIENT
Start: 2019-05-14 | End: 2020-01-21 | Stop reason: SDUPTHER

## 2019-06-14 DIAGNOSIS — I10 ESSENTIAL HYPERTENSION: Chronic | ICD-10-CM

## 2019-06-14 RX ORDER — VALSARTAN 160 MG/1
160 TABLET ORAL DAILY
Qty: 90 TABLET | Refills: 1 | Status: SHIPPED | OUTPATIENT
Start: 2019-06-14 | End: 2019-12-07 | Stop reason: SDUPTHER

## 2019-07-17 DIAGNOSIS — J44.9 CHRONIC OBSTRUCTIVE PULMONARY DISEASE, UNSPECIFIED COPD TYPE (HCC): ICD-10-CM

## 2019-07-17 RX ORDER — ALBUTEROL SULFATE 2.5 MG/3ML
SOLUTION RESPIRATORY (INHALATION)
Qty: 360 ML | Refills: 0 | Status: SHIPPED | OUTPATIENT
Start: 2019-07-17

## 2019-08-09 DIAGNOSIS — I10 ESSENTIAL HYPERTENSION: ICD-10-CM

## 2019-08-09 RX ORDER — HYDROCHLOROTHIAZIDE 12.5 MG/1
12.5 CAPSULE, GELATIN COATED ORAL EVERY MORNING
Qty: 90 CAPSULE | Refills: 0 | Status: SHIPPED | OUTPATIENT
Start: 2019-08-09 | End: 2020-01-21 | Stop reason: SDUPTHER

## 2019-10-08 DIAGNOSIS — I10 ESSENTIAL HYPERTENSION: ICD-10-CM

## 2019-10-08 RX ORDER — DILTIAZEM HYDROCHLORIDE 300 MG/1
CAPSULE, EXTENDED RELEASE ORAL
Qty: 30 CAPSULE | Refills: 0 | Status: SHIPPED | OUTPATIENT
Start: 2019-10-08 | End: 2020-01-03

## 2019-10-10 RX ORDER — DILTIAZEM HYDROCHLORIDE 300 MG/1
CAPSULE, EXTENDED RELEASE ORAL
Qty: 90 CAPSULE | Refills: 0 | OUTPATIENT
Start: 2019-10-10

## 2019-11-07 DIAGNOSIS — I10 ESSENTIAL HYPERTENSION: ICD-10-CM

## 2019-11-07 RX ORDER — HYDROCHLOROTHIAZIDE 12.5 MG/1
12.5 CAPSULE, GELATIN COATED ORAL EVERY MORNING
Qty: 90 CAPSULE | Refills: 0 | OUTPATIENT
Start: 2019-11-07

## 2019-11-07 RX ORDER — DILTIAZEM HYDROCHLORIDE 300 MG/1
CAPSULE, EXTENDED RELEASE ORAL
Qty: 30 CAPSULE | Refills: 0 | OUTPATIENT
Start: 2019-11-07

## 2019-12-07 DIAGNOSIS — I10 ESSENTIAL HYPERTENSION: Chronic | ICD-10-CM

## 2019-12-09 RX ORDER — VALSARTAN 160 MG/1
160 TABLET ORAL DAILY
Qty: 90 TABLET | Refills: 0 | Status: SHIPPED | OUTPATIENT
Start: 2019-12-09 | End: 2020-01-21 | Stop reason: SDUPTHER

## 2019-12-31 DIAGNOSIS — I10 ESSENTIAL HYPERTENSION: ICD-10-CM

## 2020-01-02 DIAGNOSIS — I10 ESSENTIAL HYPERTENSION: ICD-10-CM

## 2020-01-02 RX ORDER — DILTIAZEM HYDROCHLORIDE 300 MG/1
300 CAPSULE, COATED, EXTENDED RELEASE ORAL EVERY MORNING
Qty: 30 CAPSULE | Refills: 0 | OUTPATIENT
Start: 2020-01-02

## 2020-01-02 RX ORDER — DILTIAZEM HYDROCHLORIDE 300 MG/1
CAPSULE, EXTENDED RELEASE ORAL
Qty: 30 CAPSULE | Refills: 0 | OUTPATIENT
Start: 2020-01-02

## 2020-01-03 DIAGNOSIS — I10 ESSENTIAL HYPERTENSION: ICD-10-CM

## 2020-01-03 RX ORDER — DILTIAZEM HYDROCHLORIDE 300 MG/1
CAPSULE, EXTENDED RELEASE ORAL
Qty: 30 CAPSULE | Refills: 0 | Status: SHIPPED | OUTPATIENT
Start: 2020-01-03 | End: 2020-01-21 | Stop reason: SDUPTHER

## 2020-01-21 ENCOUNTER — OFFICE VISIT (OUTPATIENT)
Dept: FAMILY MEDICINE CLINIC | Facility: CLINIC | Age: 75
End: 2020-01-21

## 2020-01-21 VITALS
RESPIRATION RATE: 19 BRPM | OXYGEN SATURATION: 96 % | BODY MASS INDEX: 25.62 KG/M2 | SYSTOLIC BLOOD PRESSURE: 126 MMHG | HEART RATE: 75 BPM | HEIGHT: 71 IN | TEMPERATURE: 97.7 F | DIASTOLIC BLOOD PRESSURE: 60 MMHG | WEIGHT: 183 LBS

## 2020-01-21 DIAGNOSIS — Z00.00 HEALTHCARE MAINTENANCE: Primary | ICD-10-CM

## 2020-01-21 DIAGNOSIS — Z11.59 NEED FOR HEPATITIS C SCREENING TEST: ICD-10-CM

## 2020-01-21 DIAGNOSIS — I10 ESSENTIAL HYPERTENSION: ICD-10-CM

## 2020-01-21 DIAGNOSIS — E84.9 CYSTIC FIBROSIS (HCC): ICD-10-CM

## 2020-01-21 DIAGNOSIS — Z12.5 PROSTATE CANCER SCREENING: ICD-10-CM

## 2020-01-21 PROBLEM — L73.9 FOLLICULITIS: Status: RESOLVED | Noted: 2019-01-30 | Resolved: 2020-01-21

## 2020-01-21 LAB
ALBUMIN SERPL-MCNC: 4.1 G/DL (ref 3.5–5.2)
ALBUMIN/GLOB SERPL: 1.5 G/DL
ALP SERPL-CCNC: 87 U/L (ref 39–117)
ALT SERPL W P-5'-P-CCNC: 17 U/L (ref 1–41)
ANION GAP SERPL CALCULATED.3IONS-SCNC: 12.2 MMOL/L (ref 5–15)
AST SERPL-CCNC: 21 U/L (ref 1–40)
BILIRUB BLD-MCNC: NEGATIVE MG/DL
BILIRUB SERPL-MCNC: 0.3 MG/DL (ref 0.2–1.2)
BUN BLD-MCNC: 21 MG/DL (ref 8–23)
BUN/CREAT SERPL: 23.9 (ref 7–25)
CALCIUM SPEC-SCNC: 9.6 MG/DL (ref 8.6–10.5)
CHLORIDE SERPL-SCNC: 97 MMOL/L (ref 98–107)
CHOLEST SERPL-MCNC: 150 MG/DL (ref 0–200)
CLARITY, POC: CLEAR
CO2 SERPL-SCNC: 27.8 MMOL/L (ref 22–29)
COLOR UR: YELLOW
CREAT BLD-MCNC: 0.88 MG/DL (ref 0.76–1.27)
GFR SERPL CREATININE-BSD FRML MDRD: 85 ML/MIN/1.73
GLOBULIN UR ELPH-MCNC: 2.7 GM/DL
GLUCOSE BLD-MCNC: 86 MG/DL (ref 65–99)
GLUCOSE UR STRIP-MCNC: NEGATIVE MG/DL
HCV AB SER DONR QL: NORMAL
HDLC SERPL QL: 4.05
HDLC SERPL-MCNC: 37 MG/DL (ref 40–60)
KETONES UR QL: NEGATIVE
LDLC SERPL CALC-MCNC: 55 MG/DL (ref 0–100)
LEUKOCYTE EST, POC: NEGATIVE
NITRITE UR-MCNC: NEGATIVE MG/ML
PH UR: 7 [PH] (ref 5–8)
POTASSIUM BLD-SCNC: 4.8 MMOL/L (ref 3.5–5.2)
PROT SERPL-MCNC: 6.8 G/DL (ref 6–8.5)
PROT UR STRIP-MCNC: NEGATIVE MG/DL
PSA SERPL-MCNC: 4.08 NG/ML (ref 0–4)
RBC # UR STRIP: NEGATIVE /UL
SODIUM BLD-SCNC: 137 MMOL/L (ref 136–145)
SP GR UR: 1.01 (ref 1–1.03)
TRIGL SERPL-MCNC: 289 MG/DL (ref 0–150)
TSH SERPL DL<=0.05 MIU/L-ACNC: 3.24 UIU/ML (ref 0.27–4.2)
UROBILINOGEN UR QL: NORMAL
VIT B12 BLD-MCNC: 639 PG/ML (ref 211–946)
VLDLC SERPL-MCNC: 57.8 MG/DL (ref 5–40)

## 2020-01-21 PROCEDURE — 82607 VITAMIN B-12: CPT | Performed by: NURSE PRACTITIONER

## 2020-01-21 PROCEDURE — 80053 COMPREHEN METABOLIC PANEL: CPT | Performed by: NURSE PRACTITIONER

## 2020-01-21 PROCEDURE — 80061 LIPID PANEL: CPT | Performed by: NURSE PRACTITIONER

## 2020-01-21 PROCEDURE — 81003 URINALYSIS AUTO W/O SCOPE: CPT | Performed by: NURSE PRACTITIONER

## 2020-01-21 PROCEDURE — 36415 COLL VENOUS BLD VENIPUNCTURE: CPT | Performed by: NURSE PRACTITIONER

## 2020-01-21 PROCEDURE — G0103 PSA SCREENING: HCPCS | Performed by: NURSE PRACTITIONER

## 2020-01-21 PROCEDURE — 86803 HEPATITIS C AB TEST: CPT | Performed by: NURSE PRACTITIONER

## 2020-01-21 PROCEDURE — 82652 VIT D 1 25-DIHYDROXY: CPT | Performed by: NURSE PRACTITIONER

## 2020-01-21 PROCEDURE — 99214 OFFICE O/P EST MOD 30 MIN: CPT | Performed by: NURSE PRACTITIONER

## 2020-01-21 PROCEDURE — 84443 ASSAY THYROID STIM HORMONE: CPT | Performed by: NURSE PRACTITIONER

## 2020-01-21 RX ORDER — DILTIAZEM HYDROCHLORIDE 300 MG/1
300 CAPSULE, COATED, EXTENDED RELEASE ORAL DAILY
Qty: 90 CAPSULE | Refills: 3 | Status: SHIPPED | OUTPATIENT
Start: 2020-01-21

## 2020-01-21 RX ORDER — ROPINIROLE 2 MG/1
2 TABLET, FILM COATED ORAL
COMMUNITY
Start: 2020-01-07 | End: 2023-01-12 | Stop reason: ALTCHOICE

## 2020-01-21 RX ORDER — GABAPENTIN 300 MG/1
CAPSULE ORAL
COMMUNITY
Start: 2019-12-24

## 2020-01-21 RX ORDER — AZTREONAM 75 MG/ML
KIT INHALATION
COMMUNITY
Start: 2019-11-14

## 2020-01-21 RX ORDER — IVACAFTOR 150 MG/1
TABLET, FILM COATED ORAL
COMMUNITY
Start: 2020-01-07 | End: 2023-01-12 | Stop reason: ALTCHOICE

## 2020-01-21 RX ORDER — VALSARTAN 160 MG/1
160 TABLET ORAL DAILY
Qty: 90 TABLET | Refills: 3 | Status: SHIPPED | OUTPATIENT
Start: 2020-01-21

## 2020-01-21 RX ORDER — HYDROCHLOROTHIAZIDE 12.5 MG/1
12.5 CAPSULE, GELATIN COATED ORAL EVERY MORNING
Qty: 90 CAPSULE | Refills: 3 | Status: SHIPPED | OUTPATIENT
Start: 2020-01-21 | End: 2020-01-21

## 2020-01-21 RX ORDER — LEVOFLOXACIN 750 MG/1
TABLET ORAL
COMMUNITY
Start: 2020-01-10 | End: 2023-01-12

## 2020-01-24 LAB — 1,25(OH)2D3 SERPL-MCNC: 23 PG/ML (ref 19.9–79.3)

## 2020-02-05 DIAGNOSIS — N40.0 BPH WITH ELEVATED PSA: ICD-10-CM

## 2020-02-05 DIAGNOSIS — E55.9 VITAMIN D DEFICIENCY: Primary | ICD-10-CM

## 2020-02-05 DIAGNOSIS — R97.20 BPH WITH ELEVATED PSA: ICD-10-CM

## 2020-02-05 DIAGNOSIS — E78.2 MIXED HYPERLIPIDEMIA: ICD-10-CM

## 2020-02-05 PROBLEM — E84.0 CYSTIC FIBROSIS WITH PULMONARY MANIFESTATIONS: Status: ACTIVE | Noted: 2017-10-06

## 2020-02-05 PROBLEM — M81.0 AGE-RELATED OSTEOPOROSIS WITHOUT CURRENT PATHOLOGICAL FRACTURE: Status: ACTIVE | Noted: 2020-02-05

## 2020-02-05 RX ORDER — FENOFIBRATE 145 MG/1
145 TABLET, COATED ORAL DAILY
Qty: 30 TABLET | Refills: 11 | Status: SHIPPED | OUTPATIENT
Start: 2020-02-05 | End: 2023-01-12 | Stop reason: ALTCHOICE

## 2020-02-10 ENCOUNTER — TELEPHONE (OUTPATIENT)
Dept: FAMILY MEDICINE CLINIC | Facility: CLINIC | Age: 75
End: 2020-02-10

## 2020-02-10 NOTE — TELEPHONE ENCOUNTER
Luigi from [facility] states that they don't have the Calcium Vitamin D Combo 500 mg, 200 units 2 times a day, but they do have 60 0mg, 200 two a day.  He can be reached at [phone]

## 2020-03-17 ENCOUNTER — TELEPHONE (OUTPATIENT)
Dept: FAMILY MEDICINE CLINIC | Facility: CLINIC | Age: 75
End: 2020-03-17

## 2020-03-17 NOTE — TELEPHONE ENCOUNTER
Pt says he received his first hep A shot at the pharmacy.  Pt says he thinks he received the second one at his appt back in January.  Pt is requesting a call back to confirm if he received it at that appt.

## 2021-01-11 ENCOUNTER — TELEPHONE (OUTPATIENT)
Dept: FAMILY MEDICINE CLINIC | Facility: CLINIC | Age: 76
End: 2021-01-11

## 2021-01-11 NOTE — TELEPHONE ENCOUNTER
Caller: [id number] - [place], [place] - [address] [facility] [address] - [phone] [facility] [phone] FX    Relationship: Pharmacy    Best call back number: [phone]    Medication needed: Hydrochlorothiazide 12.5 MG    When do you need the refill by: 01/11/2021    What details did the patient provide when requesting the medication: Patient is out of medication.   Does the patient have less than a 3 day supply:  [x] Yes  [] No    What is the patient's preferred pharmacy: [id number] [place] [place] - [address] AT [facility] [address] - [phone]  - [phone] FX

## 2021-01-11 NOTE — TELEPHONE ENCOUNTER
Called pharmacy back and informed that provider has left the practice and he has not been seen in a year. Pt needs appt

## 2021-01-11 NOTE — TELEPHONE ENCOUNTER
Please the patient know that we will be unable to fill this medication as it is not on his medication list and he has not been seen by provider in this office in a year.  He needs to schedule appointment to establish care with a new provider as his provider has left the office.

## 2021-02-18 ENCOUNTER — TELEPHONE (OUTPATIENT)
Dept: FAMILY MEDICINE CLINIC | Facility: CLINIC | Age: 76
End: 2021-02-18

## 2021-02-18 NOTE — TELEPHONE ENCOUNTER
Mallika, a pharmacist at  [facility], requested a prescription for hydrochlorothiazide 12.5 MG tablets for the patient.    [facility] - [address] [phone] [facility] [phone] FX

## 2021-02-18 NOTE — TELEPHONE ENCOUNTER
Called and spoke to Mallika and advised that pt has not been seen in a year and Dr Copeland is unwilling to fill this medication until he establishes care with another provider here in office.

## 2023-01-12 ENCOUNTER — OFFICE VISIT (OUTPATIENT)
Dept: FAMILY MEDICINE CLINIC | Facility: CLINIC | Age: 78
End: 2023-01-12
Payer: MEDICARE

## 2023-01-12 VITALS
DIASTOLIC BLOOD PRESSURE: 62 MMHG | BODY MASS INDEX: 27.02 KG/M2 | TEMPERATURE: 98.2 F | HEART RATE: 71 BPM | HEIGHT: 71 IN | WEIGHT: 193 LBS | SYSTOLIC BLOOD PRESSURE: 124 MMHG | OXYGEN SATURATION: 96 %

## 2023-01-12 DIAGNOSIS — G62.9 NEUROPATHY: ICD-10-CM

## 2023-01-12 DIAGNOSIS — N50.812 PAIN IN LEFT TESTICLE: Primary | ICD-10-CM

## 2023-01-12 DIAGNOSIS — G25.81 RESTLESS LEG SYNDROME: ICD-10-CM

## 2023-01-12 PROCEDURE — 99214 OFFICE O/P EST MOD 30 MIN: CPT | Performed by: FAMILY MEDICINE

## 2023-01-12 RX ORDER — GABAPENTIN 100 MG/1
CAPSULE ORAL
COMMUNITY
Start: 2022-10-10

## 2023-01-12 RX ORDER — PERPHENAZINE 16 MG
600 TABLET ORAL DAILY
Qty: 90 EACH | Refills: 0 | Status: SHIPPED | OUTPATIENT
Start: 2023-01-12

## 2023-01-12 RX ORDER — ROPINIROLE 1 MG/1
1 TABLET, FILM COATED ORAL
COMMUNITY
Start: 2022-11-02

## 2023-01-12 RX ORDER — CIPROFLOXACIN 750 MG/1
750 TABLET, FILM COATED ORAL
COMMUNITY
Start: 2023-01-09 | End: 2023-01-23

## 2023-01-12 RX ORDER — ALBUTEROL SULFATE 2.5 MG/3ML
2.5 SOLUTION RESPIRATORY (INHALATION)
COMMUNITY
Start: 2022-11-02 | End: 2023-01-12

## 2023-01-12 RX ORDER — ELEXACAFTOR, TEZACAFTOR, AND IVACAFTOR 100-50-75
KIT ORAL
COMMUNITY
Start: 2022-11-03

## 2023-01-12 RX ORDER — HYDROCHLOROTHIAZIDE 25 MG/1
25 TABLET ORAL DAILY
COMMUNITY
Start: 2022-11-03

## 2023-01-12 RX ORDER — SODIUM CHLORIDE FOR INHALATION 3 %
4 VIAL, NEBULIZER (ML) INHALATION DAILY
COMMUNITY
Start: 2022-11-03

## 2023-01-12 RX ORDER — MULTIPLE VITAMINS W/ MINERALS TAB 9MG-400MCG
1 TAB ORAL DAILY
COMMUNITY
Start: 2022-08-09

## 2023-01-12 RX ORDER — ALBUTEROL SULFATE 90 UG/1
2 AEROSOL, METERED RESPIRATORY (INHALATION) EVERY 6 HOURS PRN
COMMUNITY
Start: 2022-06-27 | End: 2023-01-12

## 2023-01-12 RX ORDER — HYDROXYZINE HYDROCHLORIDE 25 MG/1
25 TABLET, FILM COATED ORAL NIGHTLY
Qty: 30 TABLET | Refills: 0 | Status: SHIPPED | OUTPATIENT
Start: 2023-01-12

## 2023-01-12 NOTE — PROGRESS NOTES
New Patient Office Visit      Patient Name: SUNG Hinds  : 1945   MRN: [id number]     Chief Complaint:    Chief Complaint   Patient presents with   • Pain     Went to urologist last week. Has pain in groin.       History of Present Illness: SUNG Hinds is a 77 y.o. male who is here today to establish care.  Patient presents as a new patient.  He has seen a urologist recently for groin pain.  He says this is a chronic issue that is going on for almost a 8 or 9 months.  He reports having cystic fibrosis.  Patient says the urologist did a fairly thorough work-up but did not have a reason for his pain.  Patient says his pain is in his left testicle and his penis.  Penile pain can occur with using the bathroom.  He does not have any urgency or dysuria.  Patient says that the testicular pain is constant and can be severe at times.  Patient says it did go away for couple months back in the summer.  Patient denies any exacerbating or alleviating factors for testicular pain other than the aspirin and heating pad that was recently prescribed.  He says that he noticed some improvement with taking aspirin and heating pad.    Patient also reports history of neuropathy and restless leg syndrome not adequately treated.  Currently on gabapentin.  He is on ropinirole for restless leg syndrome and has been offered Mirapex alternative.  He says that he is going to think about that change.  Patient currently sees other providers to refill his medications.      Review of systems was positive for restless leg, neuropathy.      Physical exam: Patient's mood and affect is appropriate.  Heart exam RRR no murmurs.  Lung exam CTA bilateral.    Subjective          Past Medical History:   Past Medical History:   Diagnosis Date   • Anxiety    • Bronchiectasis (HCC)     A.  Cystic saccular bronchiectasis, left upper lobe as well as lesser disease in right upper lobe and left low lobe.   • Folliculitis    • Fracture, carpal bone      "Right   • GERD (gastroesophageal reflux disease)    • History of chest x-ray 01/25/2016    Heart size small and vertical. Mod elevation of the RT hemidiaphragm. LT PM artery is retracted superiorly and associated w/ reticulonodular increase in markings in the ADALBERTO.Also some fibrotic changes and RT apical capping in the RT apex, although not nearly as sig.Film very similar to prior study of 11/16/2010, amny dif explained by fact that today's film is light in comparison to one from 2010   • History of chest x-ray 03/21/2011    Questionable progressive fibronodular disease in the LT chest w/ new elevation of the RT hemidiaphragm.Lateral fim does not show sig elevation of the diaphragms.   • History of chest x-ray 10/22/2009    Diaphragms flattened, especially on LT.Lungs hyperinflated.Retraction of PM artery superiorly on LT.BTL fibronodular disease in the apices which appears chonic. Much > severe on LT and there is BTL apical capping.All appears chronic although no old film for comparison   • History of peptic ulcer    • History of PFTs 01/25/2016    Mod-Severe OAD w/ a drop in FEV1 from 1.92 L to 1.55L   • History of PFTs 06/01/2015    Data is acceptable and reproducible.Given 3 puffs of xopenex. Gave a good effort. Pre Ref FVC 73%, FEV1 56%   • History of PFTs 07/29/2014    Mod Obstructive lung disease.Improved since 1/2014 (1.6 to 1.82 L)   • History of pneumonia      History of previous pneumonias and \" lung cavities\".   • History of scarlet fever    • Hypertension    • Mycobacterium avium complex (HCC)     A.  Cystic saccular bronchiectasis, left upper lobe as well as lesser disease in right upper lobe and left low lobe.   • Nausea     Also has lost 9 pounds, some of which is due to some low-grade nausea    • Neuritis    • Peptic ulcer    • Pneumonia    • Pseudomonas aeruginosa infection     Patient's sputum of 06/02/2015 is positive for pseudomonas aeruginosa that is resistant to Levaquin and patient is allergic " to penicillin, patient started on tobramycin nebulizer treatments for 10 days.   • Pulmonary nodule     Small spiculated right apical nodule stable on CT scan 2015 when compared to prior studies dating back to 2004.   • Scarlet fever    • Weakness      Has noted some weakness and malaise since on therapy.       Past Surgical History:   Past Surgical History:   Procedure Laterality Date   • BRONCHOSCOPY N/A 2017    Procedure: BRONCHOSCOPY WITH FLUORO;  Surgeon: Mario Laguerre MD;  Location: [facility] ENDOSCOPY;  Service:    • CATARACT EXTRACTION     • EYE SURGERY     • TONSILLECTOMY AND ADENOIDECTOMY         Family History:   Family History   Problem Relation Age of Onset   • Alzheimer's disease Mother    • Heart attack Father         MI   • Other Father         Hx of CABG   • No Known Problems Sister    • No Known Problems Brother    • Diabetes Other        Social History:   Social History     Socioeconomic History   • Marital status:    Tobacco Use   • Smoking status: Former     Packs/day: 0.50     Years: 4.00     Pack years: 2.00     Types: Cigarettes     Quit date:      Years since quittin.0   • Smokeless tobacco: Never   • Tobacco comments:     Smoked briefly in college.   Substance and Sexual Activity   • Alcohol use: Yes     Comment: Occasional/Social use   • Drug use: No       Medications:     Current Outpatient Medications:   •  albuterol (PROVENTIL HFA;VENTOLIN HFA) 108 (90 BASE) MCG/ACT inhaler, ProAir  (90 Base) MCG/ACT Inhalation Aerosol Solution; Patient Sig: ProAir  (90 Base) MCG/ACT Inhalation Aerosol Solution INHALE 2 PUFFS FOUR TIMES A DAY FOR COUGH; -2015; Active, Disp: , Rfl:   •  albuterol (PROVENTIL) (2.5 MG/3ML) 0.083% nebulizer solution, USE 1 VIAL VIA NEBULIZATION EVERY 4 HOURS AS NEEDED FOR WHEEZING OR SHORTNESS OF BREATH, Disp: 360 mL, Rfl: 0  •  b complex-C-folic acid 1 MG capsule, Take 1 capsule by mouth Daily., Disp: ,  "Rfl:   •  Calcium Carb-Cholecalciferol (OS-CAROLE CALCIUM + D3) 500-200 MG-UNIT tablet, Take 2 tablets by mouth Daily., Disp: 60 tablet, Rfl: 11  •  CAYSTON 75 MG reconstituted solution, , Disp: , Rfl:   •  ciprofloxacin (CIPRO) 750 MG tablet, Take 750 mg by mouth., Disp: , Rfl:   •  dilTIAZem CD (CARTIA XT) 300 MG 24 hr capsule, Take 1 capsule by mouth Daily., Disp: 90 capsule, Rfl: 3  •  dornase alpha (PULMOZYME) 2.5 MG/2.5ML nebulizer solution, Inhale 2.5 mg Daily., Disp: , Rfl:   •  Hfqywglr-Amnproi-Pekhqp&Ivacaf (Trikafta) 100-50-75 & 150 MG tablet therapy pack, Take  by mouth., Disp: , Rfl:   •  gabapentin (NEURONTIN) 100 MG capsule, TAKE 2 CAPSULES BY MOUTH IN THE AFTERNOON AND 1 IN ADDITION  MG CAPSULE AT BEDTIME, Disp: , Rfl:   •  gabapentin (NEURONTIN) 300 MG capsule, , Disp: , Rfl:   •  hydroCHLOROthiazide (HYDRODIURIL) 25 MG tablet, Take 25 mg by mouth Daily., Disp: , Rfl:   •  multivitamin with minerals tablet tablet, Take 1 tablet by mouth Daily., Disp: , Rfl:   •  omeprazole (priLOSEC) 40 MG capsule, TAKE 1 CAPSULE BY MOUTH EVERY DAY, Disp: 90 capsule, Rfl: 0  •  rOPINIRole (REQUIP) 1 MG tablet, Take 1 mg by mouth. Take 2 in afternoon and 3 at night, Disp: , Rfl:   •  sodium chloride 3 % nebulizer solution, Inhale 4 mL Daily., Disp: , Rfl:   •  valsartan (DIOVAN) 160 MG tablet, Take 1 tablet by mouth Daily., Disp: 90 tablet, Rfl: 3  •  Alpha-Lipoic Acid 600 MG capsule, Take 600 mg by mouth Daily., Disp: 90 each, Rfl: 0  •  hydrOXYzine (ATARAX) 25 MG tablet, Take 1 tablet by mouth Every Night., Disp: 30 tablet, Rfl: 0    Allergies:   Allergies   Allergen Reactions   • Horse-Derived Products    • Levofloxacin Dizziness and Other (See Comments)     Experienced dizziness, dazed/brain fog, and feeling like \"walking on stilts\" or leg numbness on 750 mg daily x 14 days. He stopped therapy on day 11 and woke up feeling fine the next day after stopping. Per Celso, consider using 500 mg dose next time and " "monitor closely for side effects.        Objective     Physical Exam: Please see above  Vital Signs:   Vitals:    01/12/23 0835   BP: 124/62   Pulse: 71   Temp: 98.2 °F (36.8 °C)   SpO2: 96%   Weight: 87.5 kg (193 lb)   Height: 180.3 cm (71\")     Body mass index is 26.92 kg/m².       Assessment / Plan      Assessment/Plan:   Diagnoses and all orders for this visit:    1. Pain in left testicle (Primary)  -     hydrOXYzine (ATARAX) 25 MG tablet; Take 1 tablet by mouth Every Night.  Dispense: 30 tablet; Refill: 0    2. Neuropathy  -     Alpha-Lipoic Acid 600 MG capsule; Take 600 mg by mouth Daily.  Dispense: 90 each; Refill: 0    3. Restless leg syndrome         1. Neuropathy could be improved by adding on alpha lipoic acid so we will try this for 3 months.  Stop if it does not work.  2. Pain left testicle could be from cystic fibrosis complication or could be a nonspecific urethritis/epididymitis.  Recommend continued follow-up with urology.  We can trial hydroxyzine to see if this helps with his symptoms at nighttime.  If no improvement stop medication.    In the future we can trial Mirapex for his restless leg syndrome, provided information today.      Address chronic conditions such as restless leg syndrome, neuropathy and pain in left testicle.  Prescribed new medications for pain in left testicle and supplement for neuropathy.  Discussed new medication Mirapex and gave patient information regarding that.  Level 4 visit assessed as we discussed his chronic conditions and alternative medications for his condition.      Follow Up:   Return in about 3 months (around 4/12/2023) for Annual.    Best Watkins DO  [facility] [place] [facility]     "

## 2023-04-24 ENCOUNTER — OFFICE VISIT (OUTPATIENT)
Dept: FAMILY MEDICINE CLINIC | Facility: CLINIC | Age: 78
End: 2023-04-24
Payer: MEDICARE

## 2023-04-24 VITALS
SYSTOLIC BLOOD PRESSURE: 124 MMHG | BODY MASS INDEX: 28.41 KG/M2 | DIASTOLIC BLOOD PRESSURE: 68 MMHG | HEIGHT: 69 IN | OXYGEN SATURATION: 100 % | TEMPERATURE: 98.6 F | RESPIRATION RATE: 21 BRPM | WEIGHT: 191.8 LBS | HEART RATE: 21 BPM

## 2023-04-24 DIAGNOSIS — R73.03 PREDIABETES: ICD-10-CM

## 2023-04-24 DIAGNOSIS — E78.2 MIXED HYPERLIPIDEMIA: ICD-10-CM

## 2023-04-24 DIAGNOSIS — Z00.00 MEDICARE ANNUAL WELLNESS VISIT, SUBSEQUENT: Primary | ICD-10-CM

## 2023-04-24 PROCEDURE — 1160F RVW MEDS BY RX/DR IN RCRD: CPT | Performed by: FAMILY MEDICINE

## 2023-04-24 PROCEDURE — 3074F SYST BP LT 130 MM HG: CPT | Performed by: FAMILY MEDICINE

## 2023-04-24 PROCEDURE — 1170F FXNL STATUS ASSESSED: CPT | Performed by: FAMILY MEDICINE

## 2023-04-24 PROCEDURE — 1159F MED LIST DOCD IN RCRD: CPT | Performed by: FAMILY MEDICINE

## 2023-04-24 PROCEDURE — 3078F DIAST BP <80 MM HG: CPT | Performed by: FAMILY MEDICINE

## 2023-04-24 PROCEDURE — G0439 PPPS, SUBSEQ VISIT: HCPCS | Performed by: FAMILY MEDICINE

## 2023-04-24 NOTE — PROGRESS NOTES
The ABCs of the Annual Wellness Visit  Subsequent Medicare Wellness Visit    Subjective    Ramírez Hinds is a 77 y.o. male who presents for a Subsequent Medicare Wellness Visit.    The following portions of the patient's history were reviewed and   updated as appropriate: allergies, current medications, past family history, past medical history, past social history, past surgical history and problem list.    Compared to one year ago, the patient feels his physical   health is worse.    Compared to one year ago, the patient feels his mental   health is the same.    Recent Hospitalizations:  He was admitted within the past 365 days at [facility].       Current Medical Providers:  Patient Care Team:  Best Watkins DO as PCP - General (Family Medicine)  Galileo Domínguez MD as Consulting Physician (Pulmonary Disease)  Venancio Perry MD as Consulting Physician (Urology)    Outpatient Medications Prior to Visit   Medication Sig Dispense Refill   • albuterol (PROVENTIL HFA;VENTOLIN HFA) 108 (90 BASE) MCG/ACT inhaler ProAir  (90 Base) MCG/ACT Inhalation Aerosol Solution; Patient Sig: ProAir  (90 Base) MCG/ACT Inhalation Aerosol Solution INHALE 2 PUFFS FOUR TIMES A DAY FOR COUGH; 1; 11; 02-Jun-2015; Active     • albuterol (PROVENTIL) (2.5 MG/3ML) 0.083% nebulizer solution USE 1 VIAL VIA NEBULIZATION EVERY 4 HOURS AS NEEDED FOR WHEEZING OR SHORTNESS OF BREATH 360 mL 0   • Alpha-Lipoic Acid 600 MG capsule Take 600 mg by mouth Daily. 90 each 0   • b complex-C-folic acid 1 MG capsule Take 1 capsule by mouth Daily.     • Calcium Carb-Cholecalciferol (OS-CAROLE CALCIUM + D3) 500-200 MG-UNIT tablet Take 2 tablets by mouth Daily. 60 tablet 11   • CAYSTON 75 MG reconstituted solution      • dilTIAZem CD (CARTIA XT) 300 MG 24 hr capsule Take 1 capsule by mouth Daily. 90 capsule 3   • dornase alpha (PULMOZYME) 2.5 MG/2.5ML nebulizer solution Inhale 2.5 mg Daily.     • Dalpujwc-Zfxdksa-Ucbhco&Ivacaf (Trikafta)  100-50-75 & 150 MG tablet therapy pack Take  by mouth.     • gabapentin (NEURONTIN) 100 MG capsule TAKE 2 CAPSULES BY MOUTH IN THE AFTERNOON AND 1 IN ADDITION  MG CAPSULE AT BEDTIME     • gabapentin (NEURONTIN) 300 MG capsule      • hydroCHLOROthiazide (HYDRODIURIL) 25 MG tablet Take 25 mg by mouth Daily.     • hydrOXYzine (ATARAX) 25 MG tablet Take 1 tablet by mouth Every Night. 30 tablet 0   • multivitamin with minerals tablet tablet Take 1 tablet by mouth Daily.     • omeprazole (priLOSEC) 40 MG capsule TAKE 1 CAPSULE BY MOUTH EVERY DAY 90 capsule 0   • rOPINIRole (REQUIP) 1 MG tablet Take 1 mg by mouth. Take 2 in afternoon and 3 at night     • sodium chloride 3 % nebulizer solution Inhale 4 mL Daily.     • valsartan (DIOVAN) 160 MG tablet Take 1 tablet by mouth Daily. 90 tablet 3     No facility-administered medications prior to visit.       No opioid medication identified on active medication list. I have reviewed chart for other potential  high risk medication/s and harmful drug interactions in the elderly.          Aspirin is not on active medication list.  Aspirin use is not indicated based on review of current medical condition/s. Risk of harm outweighs potential benefits.  .    Patient Active Problem List   Diagnosis   • Bronchiectasis   • Anxiety   • BPH without urinary obstruction   • GERD (gastroesophageal reflux disease)   • Hypertension   • Mycobacterium avium complex   • Cystic fibrosis with pulmonary manifestations   • Pseudomonas aeruginosa colonization   • Lung mass   • Neuritis   • Pseudomonas aeruginosa infection   • Age-related osteoporosis without current pathological fracture     Advance Care Planning   Advance Care Planning     Advance Directive is not on file.  ACP discussion was held with the patient during this visit. Patient does not have an advance directive, information provided.     Objective    Vitals:    04/24/23 1421   BP: 124/68   Pulse: (!) 21   Resp: 21   Temp: 98.6 °F (37  "°C)   SpO2: 100%   Weight: 87 kg (191 lb 12.8 oz)   Height: 175.3 cm (69\")     Estimated body mass index is 28.32 kg/m² as calculated from the following:    Height as of this encounter: 175.3 cm (69\").    Weight as of this encounter: 87 kg (191 lb 12.8 oz).    BMI is >= 25 and <30. (Overweight) The following options were offered after discussion;: weight loss educational material (shared in after visit summary) and exercise counseling/recommendations      Does the patient have evidence of cognitive impairment?   No            HEALTH RISK ASSESSMENT    Smoking Status:  Social History     Tobacco Use   Smoking Status Former   • Packs/day: 0.50   • Years: 4.00   • Pack years: 2.00   • Types: Cigarettes   • Quit date:    • Years since quittin.3   Smokeless Tobacco Never   Tobacco Comments    Smoked briefly in college.     Alcohol Consumption:  Social History     Substance and Sexual Activity   Alcohol Use Yes    Comment: Occasional/Social use     Fall Risk Screen:    INDIGO Fall Risk Assessment was completed, and patient is at LOW risk for falls.Assessment completed on:2023    Depression Screenin/24/2023     2:24 PM   PHQ-2/PHQ-9 Depression Screening   Little Interest or Pleasure in Doing Things 0-->not at all   Feeling Down, Depressed or Hopeless 0-->not at all   PHQ-9: Brief Depression Severity Measure Score 0       Health Habits and Functional and Cognitive Screenin/24/2023     2:25 PM   Functional & Cognitive Status   Do you have difficulty preparing food and eating? No   Do you have difficulty bathing yourself, getting dressed or grooming yourself? No   Do you have difficulty using the toilet? No   Do you have difficulty moving around from place to place? No   Do you have trouble with steps or getting out of a bed or a chair? No   Current Diet Well Balanced Diet   Dental Exam Up to date   Eye Exam Up to date   Exercise (times per week) 0 times per week   Current Exercises Include No " Regular Exercise   Do you need help using the phone?  No   Are you deaf or do you have serious difficulty hearing?  No   Do you need help with transportation? No   Do you need help shopping? No   Do you need help preparing meals?  No   Do you need help with housework?  No   Do you need help with laundry? No   Do you need help taking your medications? No   Do you need help managing money? No   Do you ever drive or ride in a car without wearing a seat belt? No   Have you felt unusual stress, anger or loneliness in the last month? No   Who do you live with? Spouse   If you need help, do you have trouble finding someone available to you? No   Have you been bothered in the last four weeks by sexual problems? No   Do you have difficulty concentrating, remembering or making decisions? No       Age-appropriate Screening Schedule:  Refer to the list below for future screening recommendations based on patient's age, sex and/or medical conditions. Orders for these recommended tests are listed in the plan section. The patient has been provided with a written plan.    Health Maintenance   Topic Date Due   • Pneumococcal Vaccine 65+ (2 - PPSV23 if available, else PCV20) 10/06/2018   • ZOSTER VACCINE (2 of 2) 12/27/2019   • COVID-19 Vaccine (2 - Pfizer series) 07/18/2022   • DXA SCAN  03/15/2023   • INFLUENZA VACCINE  08/01/2023   • LIPID PANEL  01/09/2024   • ANNUAL WELLNESS VISIT  04/24/2024   • TDAP/TD VACCINES (2 - Td or Tdap) 12/20/2028   • COLORECTAL CANCER SCREENING  03/07/2029   • HEPATITIS C SCREENING  Completed                  CMS Preventative Services Quick Reference  Risk Factors Identified During Encounter:    Immunizations Discussed/Encouraged: Prevnar 20 (Pneumococcal 20-valent conjugate) and Shingrix  Inactivity/Sedentary: Patient was advised to exercise at least 150 minutes a week per CDC recommendations.    The above risks/problems have been discussed with the patient.  Pertinent information has been shared with  the patient in the After Visit Summary.    Diagnoses and all orders for this visit:    1. Medicare annual wellness visit, subsequent (Primary)    2. Prediabetes    3. Mixed hyperlipidemia  -     Cancel: Lipid Panel; Future        Follow Up:   Next Medicare Wellness visit to be scheduled in 1 year.      An After Visit Summary and PPPS were made available to the patient.

## 2023-07-21 NOTE — PATIENT INSTRUCTIONS
By your history, it sounds as if you had persistent cough and some left pleuritic chest pain about 5 weeks ago. You took 10 days of Augmentin, then 10 days of Cipro with resolution of many of your symptoms. You still have a cough but it is mild and does not sound productive. Despite the fact that you look good on exam your chest x-ray looks slightly worse than your previous study December 2016. In addition in reviewing your CT scan from 2 weeks ago and comparing it to the old study of February 2015 your bronchiectasis looks slightly worse there is now a left pleural effusion.     Recommendation:  1 I think were going to need to proceed with a bronchoscopy to reculture this area and see if or dealing with a different infection or if you have just not responded to therapy for MAC    
The rapid strep test was negative, a throat culture was sent to the lab and someone will call if positive.  The nose swab is pending, if positive someone will call you tomorrow.  Can give Children's Tylenol (160mg/5mL), 7.5mL, every 4-6 hours for fever.   Can give Children's Motrin (100mg/5mL), 7.5mL, every 6 hours as needed for fever.  If needed, you can alternate Motrin and Tylenol every 3 hours for fever. For example, if you give Motrin first, in 3 hours if has fever, can then give Tylenol, followed by going back to Motrin 3 hours following Tylenol.  Encourage plenty of fluids to drink and monitor urine output. Should urinate at least 3 times per day.  Follow up with Pediatrician in 1-2 days.  If any new or worsening symptoms including difficulty breathing, persistent vomiting, not drinking fluids, no urine output in more than 8 hours, not acting themselves, or any other concerns return to Emergency Department     Viral Illness in Children    Your child was seen in the Emergency Department and diagnosed with a viral infection.    Viruses are tiny germs that can get into a person's body and cause illness. A virus is the most common cause of illness and fever among children. There are many different types of viruses, and they cause many types of illness, depending on what part of the body is affected. If the virus settles in the nose, throat, and lungs, it causes cough, congestion, and sometimes headache. If it settles in the stomach and intestinal tract, it may cause vomiting and diarrhea. Sometimes it causes vague symptoms of "feeling bad all over," with fussiness, poor appetite, poor sleeping, and lots of crying. A rash may also appear for the first few days, then fade away. Other symptoms can include earache, sore throat, and swollen glands.     A viral illness usually lasts 3 to 5 days, but sometimes it lasts longer, even up to 1 to 2 weeks.  ANTIBIOTICS DON’T HELP.     General tips for taking care of a child who has a viral infection:  -Have your child rest.   -Give your child acetaminophen (Tylenol) and/or ibuprofen (Advil, Motrin) for fever, pain, or fussiness. Read and follow all instructions on the label.   -Be careful when giving your child over-the-counter cold or flu medicines and acetaminophen at the same time. Many of these medicines also contain acetaminophen. Read the labels to make sure that you are not giving your child more than the recommended dose. Too much Tylenol can be harmful.   -Be careful with cough and cold medicines. Don't give them to children younger than 4 years, because they don't work for children that age and can even be harmful. For children 4 years and older, always follow all the instructions carefully. Make sure you know how much medicine to give and how long to use it. And use the dosing device if one is included.   -Attempt to give your child lots of fluids, enough so that the urine is light yellow or clear like water. This is very important if your child is vomiting or has diarrhea. Give your child sips of water or drinks such as Pedialyte. Pedialyte contains a mix of salt, sugar, and minerals. You can buy them at drugstores or grocery stores. Give these drinks as long as your child is throwing up or has diarrhea. Do not use them as the only source of liquids or food for more than 1 to 2 days.   -Keep your child home from school, , or other public places while he or she has a fever.   Follow up with your pediatrician in 1-2 days to make sure that your child is doing better.    Return to the Emergency Department if:  -Your child has symptoms of a viral illness for longer than expected.  Ask your child’s health care provider how long symptoms should last.  -Treatment at home is not controlling your child's symptoms or they are getting worse.  -Your child has signs of needing more fluids. These signs include sunken eyes with few tears, dry mouth with little or no spit, and little or no urine for 8-12 hours.  -Your child who is younger than 2 months has a temperature of 100.4°F (38°C) or higher if not already evaluated for that.  -Your child has trouble breathing.   -Your child has a severe headache or has a stiff neck.

## 2023-10-23 ENCOUNTER — OFFICE VISIT (OUTPATIENT)
Dept: FAMILY MEDICINE CLINIC | Facility: CLINIC | Age: 78
End: 2023-10-23
Payer: MEDICARE

## 2023-10-23 VITALS
DIASTOLIC BLOOD PRESSURE: 68 MMHG | OXYGEN SATURATION: 95 % | TEMPERATURE: 98.6 F | HEIGHT: 69 IN | HEART RATE: 67 BPM | BODY MASS INDEX: 27.99 KG/M2 | SYSTOLIC BLOOD PRESSURE: 138 MMHG | WEIGHT: 189 LBS

## 2023-10-23 DIAGNOSIS — R20.2 NUMBNESS AND TINGLING: Primary | ICD-10-CM

## 2023-10-23 DIAGNOSIS — R20.0 NUMBNESS AND TINGLING: Primary | ICD-10-CM

## 2023-10-23 PROCEDURE — 1159F MED LIST DOCD IN RCRD: CPT | Performed by: FAMILY MEDICINE

## 2023-10-23 PROCEDURE — 3075F SYST BP GE 130 - 139MM HG: CPT | Performed by: FAMILY MEDICINE

## 2023-10-23 PROCEDURE — 99213 OFFICE O/P EST LOW 20 MIN: CPT | Performed by: FAMILY MEDICINE

## 2023-10-23 PROCEDURE — 1160F RVW MEDS BY RX/DR IN RCRD: CPT | Performed by: FAMILY MEDICINE

## 2023-10-23 PROCEDURE — 3078F DIAST BP <80 MM HG: CPT | Performed by: FAMILY MEDICINE

## 2023-10-23 RX ORDER — ALBUTEROL SULFATE 90 UG/1
2 AEROSOL, METERED RESPIRATORY (INHALATION) EVERY 6 HOURS PRN
COMMUNITY
Start: 2023-06-22 | End: 2024-06-21

## 2023-10-23 RX ORDER — FLUTICASONE PROPIONATE 50 MCG
2 SPRAY, SUSPENSION (ML) NASAL DAILY
COMMUNITY
Start: 2023-06-02

## 2023-10-23 RX ORDER — TOBRAMYCIN 28 MG/1
CAPSULE ORAL; RESPIRATORY (INHALATION)
COMMUNITY
Start: 2023-09-05

## 2023-10-23 RX ORDER — VITAMIN B COMPLEX
1 CAPSULE ORAL DAILY
COMMUNITY
Start: 2023-05-10 | End: 2023-10-23

## 2023-10-23 RX ORDER — ALBUTEROL SULFATE 2.5 MG/3ML
2.5 SOLUTION RESPIRATORY (INHALATION)
COMMUNITY
Start: 2023-08-01 | End: 2023-10-23

## 2023-10-23 RX ORDER — IPRATROPIUM BROMIDE 21 UG/1
2 SPRAY, METERED NASAL
COMMUNITY
Start: 2023-07-06

## 2023-10-23 NOTE — PROGRESS NOTES
Follow Up Office Visit      Patient Name: Ramírez Hinds  : 1945   MRN: [id number]     Chief Complaint:    Chief Complaint   Patient presents with    Hypertension       History of Present Illness: Ramírez Hinds is a 77 y.o. male who is here today to follow up with hypertension and cystic fibrosis.  Patient has neuropathy he says that is his biggest concern.  He is doing well with his hypertension medications currently and he reports that [facility] recently changed them.  Patient says that his neuropathy is not controlled and he has a neurology referral and visit for April of next year.  He is never had a nerve conduction test as far as he can remember.  Patient is on alpha lipoic acid.  Patient on gabapentin and says it helps some.  He is reporting a sensation of abnormal feeling in his bottom of his foot.  It makes it difficult to walk at times.  Sometimes he has burning at nighttime.      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  Distal pedal pulse bilaterally was 1+.  Mood and affect appropriate.      Subjective        I have reviewed and the following portions of the patient's history were updated as appropriate: past family history, past medical history, past social history, past surgical history and problem list.    Medications:     Current Outpatient Medications:     albuterol (PROVENTIL HFA;VENTOLIN HFA) 108 (90 BASE) MCG/ACT inhaler, ProAir  (90 Base) MCG/ACT Inhalation Aerosol Solution; Patient Sig: ProAir  (90 Base) MCG/ACT Inhalation Aerosol Solution INHALE 2 PUFFS FOUR TIMES A DAY FOR COUGH; 1; 11; -2015; Active, Disp: , Rfl:     albuterol (PROVENTIL) (2.5 MG/3ML) 0.083% nebulizer solution, USE 1 VIAL VIA NEBULIZATION EVERY 4 HOURS AS NEEDED FOR WHEEZING OR SHORTNESS OF BREATH, Disp: 360 mL, Rfl: 0    albuterol sulfate  (90 Base) MCG/ACT inhaler, Inhale 2 puffs Every 6 (Six) Hours As Needed., Disp: , Rfl:     Alpha-Lipoic Acid 600 MG capsule,  "Take 600 mg by mouth Daily., Disp: 90 each, Rfl: 0    b complex-C-folic acid 1 MG capsule, Take 1 capsule by mouth Daily., Disp: , Rfl:     Calcium Carb-Cholecalciferol (OS-CAROLE CALCIUM + D3) 500-200 MG-UNIT tablet, Take 2 tablets by mouth Daily., Disp: 60 tablet, Rfl: 11    CAYSTON 75 MG reconstituted solution, , Disp: , Rfl:     dilTIAZem CD (CARTIA XT) 300 MG 24 hr capsule, Take 1 capsule by mouth Daily., Disp: 90 capsule, Rfl: 3    dornase alpha (PULMOZYME) 2.5 MG/2.5ML nebulizer solution, Inhale 2.5 mg Daily., Disp: , Rfl:     Yyrfordy-Gzlkhyv-Qsuswr&Ivacaf (Trikafta) 100-50-75 & 150 MG tablet therapy pack, Take  by mouth., Disp: , Rfl:     fluticasone (FLONASE) 50 MCG/ACT nasal spray, 2 sprays into the nostril(s) as directed by provider Daily., Disp: , Rfl:     gabapentin (NEURONTIN) 100 MG capsule, TAKE 2 CAPSULES BY MOUTH IN THE AFTERNOON AND 1 IN ADDITION  MG CAPSULE AT BEDTIME, Disp: , Rfl:     gabapentin (NEURONTIN) 300 MG capsule, , Disp: , Rfl:     hydroCHLOROthiazide (HYDRODIURIL) 25 MG tablet, Take 1 tablet by mouth Daily., Disp: , Rfl:     ipratropium (ATROVENT) 0.03 % nasal spray, 2 sprays into the nostril(s) as directed by provider., Disp: , Rfl:     Melatonin 1 MG capsule, Take 1 mg by mouth., Disp: , Rfl:     multivitamin with minerals tablet tablet, Take 1 tablet by mouth Daily., Disp: , Rfl:     omeprazole (priLOSEC) 40 MG capsule, TAKE 1 CAPSULE BY MOUTH EVERY DAY, Disp: 90 capsule, Rfl: 0    rOPINIRole (REQUIP) 1 MG tablet, Take 1 tablet by mouth. Take 2 in afternoon and 3 at night, Disp: , Rfl:     sodium chloride 3 % nebulizer solution, Inhale 4 mL Daily., Disp: , Rfl:     Darrel Podhaler 28 MG capsule, , Disp: , Rfl:     Allergies:   Allergies   Allergen Reactions    Horse-Derived Products     Levofloxacin Dizziness and Other (See Comments)     Experienced dizziness, dazed/brain fog, and feeling like \"walking on stilts\" or leg numbness on 750 mg daily x 14 days. He stopped therapy on " "day 11 and woke up feeling fine the next day after stopping. Per Celso, consider using 500 mg dose next time and monitor closely for side effects.     Linezolid Other (See Comments)     Peripheral neuropathy       Objective     Physical Exam: Please see above  Vital Signs:   Vitals:    10/23/23 1033   BP: 138/68   Pulse: 67   Temp: 98.6 °F (37 °C)   SpO2: 95%   Weight: 85.7 kg (189 lb)   Height: 175.3 cm (69\")     Body mass index is 27.91 kg/m².          Assessment / Plan      Assessment/Plan:   Diagnoses and all orders for this visit:    1. Numbness and tingling (Primary)  -     EMG & Nerve Conduction Test; Future  -     Ambulatory Referral to Neurology    Discussed patient's numbness and tingling-ordered EMG.  Referred to neurology at [facility] this patient's  neurology referral is 6 months away.  Reviewed blood work from  and I will see any abnormal results relating to his neuropathy.  We will consider further blood work in the future after EMG results.  May defer further work-up to neurology depending on patient's appointment.  May try to prescribe Lyrica for the patient in the future as well.  But we will likely defer management and work-up to neurology-    Follow Up:   Return in about 6 months (around 4/23/2024) for Medicare Wellness.    Best Watkins,   [facility] [place]   "

## 2024-01-17 ENCOUNTER — READMISSION MANAGEMENT (OUTPATIENT)
Dept: CALL CENTER | Facility: HOSPITAL | Age: 79
End: 2024-01-17
Payer: MEDICARE

## 2024-01-17 NOTE — OUTREACH NOTE
Prep Survey      Flowsheet Row Responses   [facility] facility patient discharged from? Non-BH   Is LACE score < 7 ? Non-BH Discharge   Eligibility [facility]   Date of Admission 01/02/24   Date of Discharge 01/16/24   Discharge Disposition Home or Self Care   Discharge diagnosis Cystic fibrosis with pulmonary   Does the patient have one of the following disease processes/diagnoses(primary or secondary)? Other   Prep survey completed? Yes            Charity THURSTON - Registered Nurse

## 2024-01-18 ENCOUNTER — TRANSITIONAL CARE MANAGEMENT TELEPHONE ENCOUNTER (OUTPATIENT)
Dept: CALL CENTER | Facility: HOSPITAL | Age: 79
End: 2024-01-18
Payer: MEDICARE

## 2024-01-18 ENCOUNTER — HOSPITAL ENCOUNTER (OUTPATIENT)
Dept: NEUROLOGY | Facility: HOSPITAL | Age: 79
Discharge: HOME | End: 2024-01-18
Payer: MEDICARE

## 2024-01-18 DIAGNOSIS — R20.2 NUMBNESS AND TINGLING: ICD-10-CM

## 2024-01-18 DIAGNOSIS — R20.0 NUMBNESS AND TINGLING: ICD-10-CM

## 2024-01-18 PROCEDURE — 95911 NRV CNDJ TEST 9-10 STUDIES: CPT | Performed by: PSYCHIATRY & NEUROLOGY

## 2024-01-18 PROCEDURE — 95886 MUSC TEST DONE W/N TEST COMP: CPT

## 2024-01-18 PROCEDURE — 95886 MUSC TEST DONE W/N TEST COMP: CPT | Performed by: PSYCHIATRY & NEUROLOGY

## 2024-01-18 PROCEDURE — 95911 NRV CNDJ TEST 9-10 STUDIES: CPT

## 2024-01-18 NOTE — OUTREACH NOTE
Call Center TCM Note      Flowsheet Row Responses   [facility] [place] patient discharged from? Non-BH   Does the patient have one of the following disease processes/diagnoses(primary or secondary)? Other   TCM attempt successful? Yes   Call start time 1102   Call end time 1105   Discharge diagnosis Cystic fibrosis with pulmonary   Comments [facility] d/c f/u appt on 1/24/24 @9:45am   Does the patient have an appointment with their PCP within 7-14 days of discharge? Yes   Did the patient receive a copy of their discharge instructions? Yes   Nursing interventions Reviewed instructions with patient   What is the patient's perception of their health status since discharge? Improving   Is the patient/caregiver able to teach back the hierarchy of who to call/visit for symptoms/problems? PCP, Specialist, Home health nurse, Urgent Care, ED, 911 Yes   TCM call completed? Yes   Call end time 1105   Would this patient benefit from a Referral to Amb Social Work? No   Is the patient interested in additional calls from an ambulatory ? No            Malini Robertson RN    1/18/2024, 11:07 EST

## 2024-01-24 ENCOUNTER — LAB (OUTPATIENT)
Dept: LAB | Facility: HOSPITAL | Age: 79
End: 2024-01-24
Payer: MEDICARE

## 2024-01-24 ENCOUNTER — OFFICE VISIT (OUTPATIENT)
Dept: FAMILY MEDICINE CLINIC | Facility: CLINIC | Age: 79
End: 2024-01-24
Payer: MEDICARE

## 2024-01-24 VITALS
TEMPERATURE: 98 F | WEIGHT: 197 LBS | OXYGEN SATURATION: 97 % | HEART RATE: 60 BPM | SYSTOLIC BLOOD PRESSURE: 126 MMHG | BODY MASS INDEX: 29.18 KG/M2 | HEIGHT: 69 IN | DIASTOLIC BLOOD PRESSURE: 58 MMHG

## 2024-01-24 DIAGNOSIS — E87.5 HYPERKALEMIA: ICD-10-CM

## 2024-01-24 DIAGNOSIS — D72.829 LEUKOCYTOSIS, UNSPECIFIED TYPE: ICD-10-CM

## 2024-01-24 DIAGNOSIS — Z09 HOSPITAL DISCHARGE FOLLOW-UP: Primary | ICD-10-CM

## 2024-01-24 LAB
ALBUMIN SERPL-MCNC: 3.9 G/DL (ref 3.5–5.2)
ALBUMIN/GLOB SERPL: 1.4 G/DL
ALP SERPL-CCNC: 59 U/L (ref 39–117)
ALT SERPL W P-5'-P-CCNC: 41 U/L (ref 1–41)
ANION GAP SERPL CALCULATED.3IONS-SCNC: 11.1 MMOL/L (ref 5–15)
AST SERPL-CCNC: 21 U/L (ref 1–40)
BASOPHILS # BLD AUTO: 0.06 10*3/MM3 (ref 0–0.2)
BASOPHILS NFR BLD AUTO: 0.4 % (ref 0–1.5)
BILIRUB SERPL-MCNC: 0.5 MG/DL (ref 0–1.2)
BUN SERPL-MCNC: 26 MG/DL (ref 8–23)
BUN/CREAT SERPL: 24.1 (ref 7–25)
CALCIUM SPEC-SCNC: 9.5 MG/DL (ref 8.6–10.5)
CHLORIDE SERPL-SCNC: 97 MMOL/L (ref 98–107)
CO2 SERPL-SCNC: 29.9 MMOL/L (ref 22–29)
CREAT SERPL-MCNC: 1.08 MG/DL (ref 0.76–1.27)
DEPRECATED RDW RBC AUTO: 42.9 FL (ref 37–54)
EGFRCR SERPLBLD CKD-EPI 2021: 70.2 ML/MIN/1.73
EOSINOPHIL # BLD AUTO: 0.28 10*3/MM3 (ref 0–0.4)
EOSINOPHIL NFR BLD AUTO: 1.7 % (ref 0.3–6.2)
ERYTHROCYTE [DISTWIDTH] IN BLOOD BY AUTOMATED COUNT: 13.4 % (ref 12.3–15.4)
GLOBULIN UR ELPH-MCNC: 2.8 GM/DL
GLUCOSE SERPL-MCNC: 92 MG/DL (ref 65–99)
HCT VFR BLD AUTO: 38.3 % (ref 37.5–51)
HGB BLD-MCNC: 13.1 G/DL (ref 13–17.7)
IMM GRANULOCYTES # BLD AUTO: 0.18 10*3/MM3 (ref 0–0.05)
IMM GRANULOCYTES NFR BLD AUTO: 1.1 % (ref 0–0.5)
LYMPHOCYTES # BLD AUTO: 2.37 10*3/MM3 (ref 0.7–3.1)
LYMPHOCYTES NFR BLD AUTO: 14.4 % (ref 19.6–45.3)
MCH RBC QN AUTO: 30.5 PG (ref 26.6–33)
MCHC RBC AUTO-ENTMCNC: 34.2 G/DL (ref 31.5–35.7)
MCV RBC AUTO: 89.1 FL (ref 79–97)
MONOCYTES # BLD AUTO: 1.08 10*3/MM3 (ref 0.1–0.9)
MONOCYTES NFR BLD AUTO: 6.5 % (ref 5–12)
NEUTROPHILS NFR BLD AUTO: 12.53 10*3/MM3 (ref 1.7–7)
NEUTROPHILS NFR BLD AUTO: 75.9 % (ref 42.7–76)
NRBC BLD AUTO-RTO: 0 /100 WBC (ref 0–0.2)
PLATELET # BLD AUTO: 270 10*3/MM3 (ref 140–450)
PMV BLD AUTO: 9.8 FL (ref 6–12)
POTASSIUM SERPL-SCNC: 4.4 MMOL/L (ref 3.5–5.2)
PROT SERPL-MCNC: 6.7 G/DL (ref 6–8.5)
RBC # BLD AUTO: 4.3 10*6/MM3 (ref 4.14–5.8)
SODIUM SERPL-SCNC: 138 MMOL/L (ref 136–145)
WBC NRBC COR # BLD AUTO: 16.5 10*3/MM3 (ref 3.4–10.8)

## 2024-01-24 PROCEDURE — 80053 COMPREHEN METABOLIC PANEL: CPT

## 2024-01-24 PROCEDURE — 85025 COMPLETE CBC W/AUTO DIFF WBC: CPT

## 2024-01-24 RX ORDER — FLUOCINONIDE TOPICAL SOLUTION USP, 0.05% 0.5 MG/ML
SOLUTION TOPICAL
COMMUNITY
End: 2024-01-25

## 2024-01-24 RX ORDER — PSEUDOEPHEDRINE HCL 30 MG
500 TABLET ORAL
COMMUNITY
Start: 2023-10-27 | End: 2024-01-24

## 2024-01-24 RX ORDER — ASPIRIN 325 MG
325 TABLET, DELAYED RELEASE (ENTERIC COATED) ORAL
COMMUNITY

## 2024-01-24 RX ORDER — DILTIAZEM HYDROCHLORIDE 360 MG/1
360 CAPSULE, EXTENDED RELEASE ORAL DAILY
COMMUNITY
Start: 2024-01-17 | End: 2024-02-16

## 2024-01-24 RX ORDER — KETOCONAZOLE 20 MG/ML
SHAMPOO TOPICAL
COMMUNITY
Start: 2023-11-17

## 2024-01-24 RX ORDER — GUAIFENESIN 600 MG/1
600 TABLET, EXTENDED RELEASE ORAL
COMMUNITY
Start: 2023-12-14

## 2024-01-24 RX ORDER — IBUPROFEN 800 MG/1
TABLET ORAL
COMMUNITY
End: 2024-01-25

## 2024-01-24 RX ORDER — LANOLIN ALCOHOL/MO/W.PET/CERES
0.4 CREAM (GRAM) TOPICAL DAILY
COMMUNITY

## 2024-01-24 RX ORDER — KETOCONAZOLE 20 MG/G
CREAM TOPICAL
COMMUNITY
Start: 2023-11-17

## 2024-01-24 RX ORDER — HYDROCHLOROTHIAZIDE 50 MG/1
50 TABLET ORAL DAILY
COMMUNITY
Start: 2024-01-17

## 2024-01-24 NOTE — PROGRESS NOTES
Transitional Care Follow Up Visit     Patient Name: Ramírez Hinds  : 1945   MRN: [id number]     Chief Complaint:    Chief Complaint   Patient presents with    Transitional Care Management       History of Present Illness: Ramírez Hinds is a 78 y.o. male who presents today for transitional care management visit.  The patient was contacted by our office within 48 hours after the discharge of the patient via telephone to coordinate their follow up care and needs.  Patient presents with cystic fibrosis.  He was hospitalized due to exacerbation.  Required oxygen therapy, antibiotics, and fluid resuscitation.  Noted to have tachycardia which resolved.  He has a heart monitor currently.  He says he is doing much better today as far as breathing.      Patient now using oxygen 2 L nasal cannula on exertion and at bedtime.    Subjective      Review of Systems:   Review of Systems    I reviewed and discussed the details of that call along with the discharge summary, hospital problems, inpatient lab results, inpatient diagnostic studies, and consultation reports with Ramírez Hinds.     Current outpatient and discharge medications have been reconciled for the patient.      2024    12:09 PM   Date of TCM Phone Call   [facility]   Date of Admission 2024   Date of Discharge 2024   Discharge Disposition Home or Self Care       Medications:     Current Outpatient Medications:     albuterol (PROVENTIL HFA;VENTOLIN HFA) 108 (90 BASE) MCG/ACT inhaler, ProAir  (90 Base) MCG/ACT Inhalation Aerosol Solution; Patient Sig: ProAir  (90 Base) MCG/ACT Inhalation Aerosol Solution INHALE 2 PUFFS FOUR TIMES A DAY FOR COUGH; 1; 11; 2015; Active, Disp: , Rfl:     albuterol (PROVENTIL) (2.5 MG/3ML) 0.083% nebulizer solution, USE 1 VIAL VIA NEBULIZATION EVERY 4 HOURS AS NEEDED FOR WHEEZING OR SHORTNESS OF BREATH, Disp: 360 mL, Rfl: 0    Alpha-Lipoic Acid 600 MG capsule, Take 600 mg by mouth  Daily., Disp: 90 each, Rfl: 0    aspirin 325 MG EC tablet, Take 1 tablet by mouth., Disp: , Rfl:     b complex-C-folic acid 1 MG capsule, Take 1 capsule by mouth Daily., Disp: , Rfl:     Calcium Carb-Cholecalciferol (OS-CAROLE CALCIUM + D3) 500-200 MG-UNIT tablet, Take 2 tablets by mouth Daily., Disp: 60 tablet, Rfl: 11    COLISTIMETHATE SODIUM, CBA, IJ, Inhale 75 mg., Disp: , Rfl:     cyanocobalamin (VITAMIN B-12) 500 MCG tablet, Take 1 tablet by mouth Daily., Disp: , Rfl:     dilTIAZem CD (CARDIZEM CD) 360 MG 24 hr capsule, Take 1 capsule by mouth Daily., Disp: , Rfl:     dornase alpha (PULMOZYME) 2.5 MG/2.5ML nebulizer solution, Inhale 2.5 mg Daily., Disp: , Rfl:     Uubeovqd-Juwtpet-Ezivbi&Ivacaf (Trikafta) 100-50-75 & 150 MG tablet therapy pack, Take  by mouth., Disp: , Rfl:     fluocinonide (LIDEX) 0.05 % external solution, APPLY AT BEDTIME AS DIRECTED. NOT FOR FACE OR GENITALS, Disp: , Rfl:     fluticasone (FLONASE) 50 MCG/ACT nasal spray, 2 sprays into the nostril(s) as directed by provider Daily., Disp: , Rfl:     folic acid (FOLVITE) 400 MCG tablet, Take 1 tablet by mouth Daily., Disp: , Rfl:     gabapentin (NEURONTIN) 100 MG capsule, TAKE 2 CAPSULES BY MOUTH IN THE AFTERNOON AND 1 IN ADDITION  MG CAPSULE AT BEDTIME, Disp: , Rfl:     gabapentin (NEURONTIN) 300 MG capsule, , Disp: , Rfl:     guaiFENesin (MUCINEX) 600 MG 12 hr tablet, Take 1 tablet by mouth., Disp: , Rfl:     hydroCHLOROthiazide (HYDRODIURIL) 50 MG tablet, Take 1 tablet by mouth Daily., Disp: , Rfl:     ibuprofen (ADVIL,MOTRIN) 800 MG tablet, TAKE 1 TABLET BY MOUTH EVERY 6-8 HOURS AS NEEDED, Disp: , Rfl:     ipratropium (ATROVENT) 0.03 % nasal spray, 2 sprays into the nostril(s) as directed by provider., Disp: , Rfl:     ketoconazole (NIZORAL) 2 % cream, APPLY TO THE AFFECTED AREA(S) BY TOPICAL ROUTE TWICE DAILY, Disp: , Rfl:     ketoconazole (NIZORAL) 2 % shampoo, APPLY TO THE AFFECTED AREA(S), LATHER, LEAVE IN PLACE FOR 5 MINUTES, AND  "THEN RINSE OFF WITH WATER BY TOPICAL ROUTE THREE TIMES PER WEEK., Disp: , Rfl:     Melatonin 1 MG capsule, Take 1 mg by mouth., Disp: , Rfl:     multivitamin with minerals tablet tablet, Take 1 tablet by mouth Daily., Disp: , Rfl:     omeprazole (priLOSEC) 40 MG capsule, TAKE 1 CAPSULE BY MOUTH EVERY DAY, Disp: 90 capsule, Rfl: 0    rOPINIRole (REQUIP) 1 MG tablet, Take 1 tablet by mouth. Take 2 in afternoon and 3 at night, Disp: , Rfl:     sodium chloride 3 % nebulizer solution, Inhale 4 mL Daily., Disp: , Rfl:     Darrel Podhaler 28 MG capsule, , Disp: , Rfl:     Allergies:   Allergies   Allergen Reactions    Horse-Derived Products     Levofloxacin Dizziness and Other (See Comments)     Experienced dizziness, dazed/brain fog, and feeling like \"walking on stilts\" or leg numbness on 750 mg daily x 14 days. He stopped therapy on day 11 and woke up feeling fine the next day after stopping. Per Celso, consider using 500 mg dose next time and monitor closely for side effects.     Linezolid Other (See Comments)     Peripheral neuropathy       Hospital Course Information:  Risk for Readmission (LACE) No data recorded     Course During Hospital Stay:  Patient was admitted for acute cystic fibrosis exacerbation with nocturnal hypoxia.  Patient evaluated in the ED and respiratory cultures were obtained.  Did have pulmonary function test spirometry as well.  Respiratory culture showed mixed tom.  Extremity improved between 1/2 and 1/12.  Patient's x-ray showed bronchiectasis with pleural effusion.  CT confirmed.  Echo showed normal ejection fraction.  PICC line was placed on 1/4.  Patient was on IV antibiotics which was discontinued on 1/16.  And his spirometry continues to improve.  Patient was treated with prednisone and nebulizers.  Patient was discharged home on 2 L nasal cannula and PICC line.    Of note patient had new onset multifocal atrial tachycardia.  Cardiology was consulted.  Patient is on diltiazem and was " "provided with a new medication prescription.  Patient was advised to wear oxygen on exertion.    Hypertension was uncontrolled.  Medications were adjusted and patient had to avoid ACE inhibitor's and ARB's due to mild hyperkalemia.  Patient was to continue to monitor blood pressure at home.  Patient is on a steroid taper and may need to decrease blood pressure medication after being done.    Hyperkalemia was addressed while in the hospital.  P.o. hydration was provided.  Patient needs repeat blood work at follow-up.  Other conditions are chronic and stable include restless leg symptoms, osteopenia, and anemia.      PMH, PSH, Care Team, Medications including OTC/CAM and Allergies reviewed and updated as appropriate.     Objective     Physical Exam:  Vital Signs:   Vitals:    01/24/24 1009   BP: 126/58   Pulse: 60   Temp: 98 °F (36.7 °C)   SpO2: 97%   Weight: 89.4 kg (197 lb)   Height: 175.3 cm (69\")     Body mass index is 29.09 kg/m².     Physical Exam    Assessment / Plan      Assessment/Plan:   Diagnoses and all orders for this visit:    1. Hospital discharge follow-up (Primary)    2. Hyperkalemia  -     Comprehensive Metabolic Panel; Future    3. Leukocytosis, unspecified type  -     CBC & Differential; Future         Patient doing much better.  Patient to follow-up with pulmonology.  Continue nasal cannula as needed  Recheck hyperkalemia leukocytosis today.        Transitional Care Management Certification  I certify that the following are true:  Communication was made within 2 business days of discharge.  Complexity of Medical Decision Making is moderate.  Face to face visit occurred within 10 days.    Follow Up:   Return for Labs today, Next scheduled follow up.    Best Watkins DO  [facility] [place]    "

## 2024-01-25 ENCOUNTER — LAB (OUTPATIENT)
Dept: LAB | Facility: HOSPITAL | Age: 79
End: 2024-01-25
Payer: MEDICARE

## 2024-01-25 ENCOUNTER — OFFICE VISIT (OUTPATIENT)
Dept: NEUROLOGY | Facility: CLINIC | Age: 79
End: 2024-01-25
Payer: MEDICARE

## 2024-01-25 VITALS
HEART RATE: 62 BPM | HEIGHT: 69 IN | OXYGEN SATURATION: 93 % | DIASTOLIC BLOOD PRESSURE: 58 MMHG | WEIGHT: 197 LBS | BODY MASS INDEX: 29.18 KG/M2 | SYSTOLIC BLOOD PRESSURE: 126 MMHG

## 2024-01-25 DIAGNOSIS — G63 POLYNEUROPATHY ASSOCIATED WITH UNDERLYING DISEASE: Primary | ICD-10-CM

## 2024-01-25 DIAGNOSIS — R20.2 NUMBNESS AND TINGLING: ICD-10-CM

## 2024-01-25 DIAGNOSIS — R20.0 NUMBNESS AND TINGLING: ICD-10-CM

## 2024-01-25 PROBLEM — G62.9 NEUROPATHY: Status: ACTIVE | Noted: 2019-01-30

## 2024-01-25 PROBLEM — L81.4 SOLAR LENTIGO: Status: ACTIVE | Noted: 2023-11-17

## 2024-01-25 PROBLEM — M85.80 OSTEOPENIA: Status: ACTIVE | Noted: 2020-03-09

## 2024-01-25 PROBLEM — G25.81 RESTLESS LEG SYNDROME: Status: ACTIVE | Noted: 2021-10-14

## 2024-01-25 PROBLEM — R91.1 PULMONARY NODULE: Status: ACTIVE | Noted: 2019-01-30

## 2024-01-25 PROBLEM — Z15.89 BIALLELIC MUTATION OF CFTR GENE: Status: ACTIVE | Noted: 2017-11-21

## 2024-01-25 PROBLEM — I78.1 SENILE ANGIOMA: Status: ACTIVE | Noted: 2023-11-17

## 2024-01-25 PROBLEM — D22.9 MULTIPLE BENIGN MELANOCYTIC NEVI: Status: ACTIVE | Noted: 2023-11-17

## 2024-01-25 PROBLEM — K27.9 PEPTIC ULCER: Status: ACTIVE | Noted: 2024-01-25

## 2024-01-25 PROBLEM — L82.1 SEBORRHEIC KERATOSIS: Status: ACTIVE | Noted: 2023-11-17

## 2024-01-25 LAB — CK SERPL-CCNC: 138 U/L (ref 20–200)

## 2024-01-25 PROCEDURE — 86235 NUCLEAR ANTIGEN ANTIBODY: CPT

## 2024-01-25 PROCEDURE — 86364 TISS TRNSGLTMNASE EA IG CLAS: CPT

## 2024-01-25 PROCEDURE — 36415 COLL VENOUS BLD VENIPUNCTURE: CPT

## 2024-01-25 PROCEDURE — 83825 ASSAY OF MERCURY: CPT

## 2024-01-25 PROCEDURE — 86231 EMA EACH IG CLASS: CPT

## 2024-01-25 PROCEDURE — 82595 ASSAY OF CRYOGLOBULIN: CPT

## 2024-01-25 PROCEDURE — 86334 IMMUNOFIX E-PHORESIS SERUM: CPT

## 2024-01-25 PROCEDURE — 82300 ASSAY OF CADMIUM: CPT

## 2024-01-25 PROCEDURE — 99214 OFFICE O/P EST MOD 30 MIN: CPT | Performed by: NURSE PRACTITIONER

## 2024-01-25 PROCEDURE — 3074F SYST BP LT 130 MM HG: CPT | Performed by: NURSE PRACTITIONER

## 2024-01-25 PROCEDURE — 86617 LYME DISEASE ANTIBODY: CPT

## 2024-01-25 PROCEDURE — 1160F RVW MEDS BY RX/DR IN RCRD: CPT | Performed by: NURSE PRACTITIONER

## 2024-01-25 PROCEDURE — 82550 ASSAY OF CK (CPK): CPT

## 2024-01-25 PROCEDURE — 3078F DIAST BP <80 MM HG: CPT | Performed by: NURSE PRACTITIONER

## 2024-01-25 PROCEDURE — 86225 DNA ANTIBODY NATIVE: CPT

## 2024-01-25 PROCEDURE — 84155 ASSAY OF PROTEIN SERUM: CPT

## 2024-01-25 PROCEDURE — 86038 ANTINUCLEAR ANTIBODIES: CPT

## 2024-01-25 PROCEDURE — 1159F MED LIST DOCD IN RCRD: CPT | Performed by: NURSE PRACTITIONER

## 2024-01-25 PROCEDURE — 86431 RHEUMATOID FACTOR QUANT: CPT

## 2024-01-25 PROCEDURE — 82570 ASSAY OF URINE CREATININE: CPT

## 2024-01-25 PROCEDURE — 83655 ASSAY OF LEAD: CPT

## 2024-01-25 PROCEDURE — 84165 PROTEIN E-PHORESIS SERUM: CPT

## 2024-01-25 PROCEDURE — 82784 ASSAY IGA/IGD/IGG/IGM EACH: CPT

## 2024-01-25 PROCEDURE — 86200 CCP ANTIBODY: CPT

## 2024-01-25 PROCEDURE — 82175 ASSAY OF ARSENIC: CPT

## 2024-01-25 NOTE — LETTER
2024     Best Watkins DO  [facility] [address]    Patient: Ramírez Hinds   YOB: 1945   Date of Visit: 2024     Dear Best Watkins DO:       Thank you for referring Ramírez Hinds to me for evaluation. Below are the relevant portions of my assessment and plan of care.    If you have questions, please do not hesitate to call me. I look forward to following Ramírez along with you.         Sincerely,        Lore Mcmillan DNP, APRN        CC: No Recipients    Lore Mcmillan DNP, APRN  [phone]  Signed     Neuro Office Visit      Encounter Date: 2024   Patient Name: Ramírez Hinds  : 1945   MRN: [id number]   PCP: Best Watkins DO  Chief Complaint:    Chief Complaint   Patient presents with   • Numbness and tingling       History of Present Illness: Ramírez Hinds is a 78 y.o. male who is here today in Neurology for numbness and tingling.    Paresthesia  78 year old white male diagnosed with cystic fibrosis 8 years ago managed by Dr Machuca at  [facility].  2010 developed pain in feet and trouble walking. Got new shoes at [employer] shop and symptoms improved but never resolved.  Initially feet feel heavy w decreased sensation. Feet feel swollen but are not swollen. Pain is achey and tender. Denies burning or electric shock sensation. Sensation up to ankles bilat.  Feel better to have socks on. Denies balance problems. No falls. No symptoms in fingers.  Has been treated with GBP and requip with good results. He is able to sleep at night. Very painful to miss a dose.  Denies falls and balance problems.  Healthy diet.  Taking MVI and vit B12 and vit B complex. States he has never had typical malabsorption problems of most CF patient.  Symptoms have been made worse by IV infusions of different antibiotics.  Taking alpha lipoic acid and GBP w ropinirole.    Started on new inhaler  Colistimethate inh 3 days ago and neuropathy is much  worse.    EMG & Nerve Conduction Test (01/18/2024 13:30) -Peripheral neuropathy, chiefly sensory, axonal, mild     MRI Brain With & Without Contrast (02/05/2019 09:10)-neg    Vit D, it b12 > 2000, crp. No history of diabetes    PMH: htn, cystic fibrosis, neuropathy, GRD, BPH, anxiety, lung mass, Biallelic mutation of CFTR gene  FH:Alz dx, cad, DM  SH: former smoker, +etoh, -drug  Subjective      Past Medical History:   Past Medical History:   Diagnosis Date   • Anxiety    • Bronchiectasis     A.  Cystic saccular bronchiectasis, left upper lobe as well as lesser disease in right upper lobe and left low lobe.   • Folliculitis    • Fracture, carpal bone     Right   • GERD (gastroesophageal reflux disease)    • History of chest x-ray 01/25/2016    Heart size small and vertical. Mod elevation of the RT hemidiaphragm. LT PM artery is retracted superiorly and associated w/ reticulonodular increase in markings in the ADALBERTO.Also some fibrotic changes and RT apical capping in the RT apex, although not nearly as sig.Film very similar to prior study of 11/16/2010, amny dif explained by fact that today's film is light in comparison to one from 2010   • History of chest x-ray 03/21/2011    Questionable progressive fibronodular disease in the LT chest w/ new elevation of the RT hemidiaphragm.Lateral fim does not show sig elevation of the diaphragms.   • History of chest x-ray 10/22/2009    Diaphragms flattened, especially on LT.Lungs hyperinflated.Retraction of PM artery superiorly on LT.BTL fibronodular disease in the apices which appears chonic. Much > severe on LT and there is BTL apical capping.All appears chronic although no old film for comparison   • History of peptic ulcer    • History of PFTs 01/25/2016    Mod-Severe OAD w/ a drop in FEV1 from 1.92 L to 1.55L   • History of PFTs 06/01/2015    Data is acceptable and reproducible.Given 3 puffs of xopenex. Gave a good effort. Pre Ref FVC 73%, FEV1 56%   • History of PFTs  "2014    Mod Obstructive lung disease.Improved since 2014 (1.6 to 1.82 L)   • History of pneumonia      History of previous pneumonias and \" lung cavities\".   • History of scarlet fever    • Hypertension    • Mycobacterium avium complex     A.  Cystic saccular bronchiectasis, left upper lobe as well as lesser disease in right upper lobe and left low lobe.   • Nausea     Also has lost 9 pounds, some of which is due to some low-grade nausea    • Neuritis    • Peptic ulcer    • Pneumonia    • Pseudomonas aeruginosa infection     Patient's sputum of 2015 is positive for pseudomonas aeruginosa that is resistant to Levaquin and patient is allergic to penicillin, patient started on tobramycin nebulizer treatments for 10 days.   • Pulmonary nodule     Small spiculated right apical nodule stable on CT scan 2015 when compared to prior studies dating back to 2004.   • Scarlet fever    • Weakness      Has noted some weakness and malaise since on therapy.       Past Surgical History:   Past Surgical History:   Procedure Laterality Date   • BRONCHOSCOPY N/A 2017    Procedure: BRONCHOSCOPY WITH FLUORO;  Surgeon: Mario Laguerre MD;  Location: [facility] ENDOSCOPY;  Service:    • CATARACT EXTRACTION     • EYE SURGERY     • TONSILLECTOMY AND ADENOIDECTOMY         Family History:   Family History   Problem Relation Age of Onset   • Alzheimer's disease Mother    • Heart attack Father         MI   • Other Father         Hx of CABG   • No Known Problems Sister    • No Known Problems Brother    • Diabetes Other        Social History:   Social History     Socioeconomic History   • Marital status:    Tobacco Use   • Smoking status: Former     Packs/day: 0.50     Years: 4.00     Additional pack years: 0.00     Total pack years: 2.00     Types: Cigarettes     Quit date:      Years since quittin.1   • Smokeless tobacco: Never   • Tobacco comments:     Smoked briefly in college.   Substance " and Sexual Activity   • Alcohol use: Yes     Comment: Occasional/Social use   • Drug use: No       Medications:     Current Outpatient Medications:   •  albuterol (PROVENTIL HFA;VENTOLIN HFA) 108 (90 BASE) MCG/ACT inhaler, ProAir  (90 Base) MCG/ACT Inhalation Aerosol Solution; Patient Sig: ProAir  (90 Base) MCG/ACT Inhalation Aerosol Solution INHALE 2 PUFFS FOUR TIMES A DAY FOR COUGH; 1; 11; 02-Jun-2015; Active, Disp: , Rfl:   •  albuterol (PROVENTIL) (2.5 MG/3ML) 0.083% nebulizer solution, USE 1 VIAL VIA NEBULIZATION EVERY 4 HOURS AS NEEDED FOR WHEEZING OR SHORTNESS OF BREATH, Disp: 360 mL, Rfl: 0  •  Alpha-Lipoic Acid 600 MG capsule, Take 600 mg by mouth Daily., Disp: 90 each, Rfl: 0  •  aspirin 325 MG EC tablet, Take 1 tablet by mouth., Disp: , Rfl:   •  b complex-C-folic acid 1 MG capsule, Take 1 capsule by mouth Daily., Disp: , Rfl:   •  Calcium Carb-Cholecalciferol (OS-CAROLE CALCIUM + D3) 500-200 MG-UNIT tablet, Take 2 tablets by mouth Daily., Disp: 60 tablet, Rfl: 11  •  COLISTIMETHATE SODIUM, CBA, IJ, Inhale 75 mg., Disp: , Rfl:   •  cyanocobalamin (VITAMIN B-12) 500 MCG tablet, Take 1 tablet by mouth Daily., Disp: , Rfl:   •  dilTIAZem CD (CARDIZEM CD) 360 MG 24 hr capsule, Take 1 capsule by mouth Daily., Disp: , Rfl:   •  dornase alpha (PULMOZYME) 2.5 MG/2.5ML nebulizer solution, Inhale 2.5 mg Daily., Disp: , Rfl:   •  Iwwwvtnl-Qkitdlh-Ipqmlo&Ivacaf (Trikafta) 100-50-75 & 150 MG tablet therapy pack, Take  by mouth., Disp: , Rfl:   •  fluticasone (FLONASE) 50 MCG/ACT nasal spray, 2 sprays into the nostril(s) as directed by provider Daily., Disp: , Rfl:   •  folic acid (FOLVITE) 400 MCG tablet, Take 1 tablet by mouth Daily., Disp: , Rfl:   •  gabapentin (NEURONTIN) 100 MG capsule, TAKE 2 CAPSULES BY MOUTH IN THE AFTERNOON AND 1 IN ADDITION  MG CAPSULE AT BEDTIME, Disp: , Rfl:   •  gabapentin (NEURONTIN) 300 MG capsule, , Disp: , Rfl:   •  hydroCHLOROthiazide (HYDRODIURIL) 50 MG tablet, Take  "1 tablet by mouth Daily., Disp: , Rfl:   •  ipratropium (ATROVENT) 0.03 % nasal spray, 2 sprays into the nostril(s) as directed by provider., Disp: , Rfl:   •  ketoconazole (NIZORAL) 2 % cream, APPLY TO THE AFFECTED AREA(S) BY TOPICAL ROUTE TWICE DAILY, Disp: , Rfl:   •  ketoconazole (NIZORAL) 2 % shampoo, APPLY TO THE AFFECTED AREA(S), LATHER, LEAVE IN PLACE FOR 5 MINUTES, AND THEN RINSE OFF WITH WATER BY TOPICAL ROUTE THREE TIMES PER WEEK., Disp: , Rfl:   •  Melatonin 1 MG capsule, Take 1 mg by mouth., Disp: , Rfl:   •  multivitamin with minerals tablet tablet, Take 1 tablet by mouth Daily., Disp: , Rfl:   •  omeprazole (priLOSEC) 40 MG capsule, TAKE 1 CAPSULE BY MOUTH EVERY DAY, Disp: 90 capsule, Rfl: 0  •  rOPINIRole (REQUIP) 1 MG tablet, Take 1 tablet by mouth. Take 2 in afternoon and 3 at night, Disp: , Rfl:   •  sodium chloride 3 % nebulizer solution, Inhale 4 mL Daily., Disp: , Rfl:   •  Darrel Podhaler 28 MG capsule, , Disp: , Rfl:   •  guaiFENesin (MUCINEX) 600 MG 12 hr tablet, Take 1 tablet by mouth. (Patient not taking: Reported on 1/25/2024), Disp: , Rfl:     Allergies:   Allergies   Allergen Reactions   • Horse-Derived Products    • Levofloxacin Dizziness and Other (See Comments)     Experienced dizziness, dazed/brain fog, and feeling like \"walking on stilts\" or leg numbness on 750 mg daily x 14 days. He stopped therapy on day 11 and woke up feeling fine the next day after stopping. Per Celso, consider using 500 mg dose next time and monitor closely for side effects.    • Linezolid Other (See Comments)     Peripheral neuropathy       PHQ-9 Total Score:     STEADI Fall Risk Assessment was completed, and patient is at LOW risk for falls.Assessment completed on:1/25/2024    Objective     Physical Exam:   Physical Exam  Neurological:      Mental Status: He is oriented to person, place, and time.      Gait: Gait is intact.      Deep Tendon Reflexes:      Reflex Scores:       Tricep reflexes are 2+ on the right " side and 2+ on the left side.       Bicep reflexes are 2+ on the right side and 2+ on the left side.       Brachioradialis reflexes are 2+ on the right side and 2+ on the left side.       Patellar reflexes are 2+ on the right side and 2+ on the left side.       Achilles reflexes are 2+ on the right side and 2+ on the left side.  Psychiatric:         Speech: Speech normal.         Neurologic Exam     Mental Status   Oriented to person, place, and time.   Follows 3 step commands.   Attention: normal. Concentration: normal.   Speech: speech is normal   Level of consciousness: alert  Knowledge: consistent with education.   Normal comprehension.     Cranial Nerves     CN III, IV, VI   Right pupil: Accommodation: intact.   Left pupil: Accommodation: intact.   CN III: no CN III palsy  CN VI: no CN VI palsy  Nystagmus: none   Diplopia: none  Upgaze: normal  Downgaze: normal  Conjugate gaze: present    CN VII   Facial expression full, symmetric.     CN VIII   Hearing: intact    Motor Exam   Muscle bulk: normal  Overall muscle tone: normal    Strength   Right biceps: 5/5  Left biceps: 5/5  Right triceps: 5/5  Left triceps: 5/5  Right interossei: 5/5  Left interossei: 5/5  Right quadriceps: 5/5  Left quadriceps: 5/5  Right anterior tibial: 5/5  Left anterior tibial: 5/5  Right posterior tibial: 5/5  Left posterior tibial: 5/5    Sensory Exam   Light touch normal.   Right arm light touch: normal  Left arm light touch: normal  Right leg light touch: normal  Left leg light touch: normal  Right arm vibration: normal  Left arm vibration: decreased from elbow  Right leg vibration: decreased from toes  Left leg vibration: decreased from toes    Gait, Coordination, and Reflexes     Gait  Gait: normal    Tremor   Resting tremor: absent  Action tremor: absent    Reflexes   Right brachioradialis: 2+  Left brachioradialis: 2+  Right biceps: 2+  Left biceps: 2+  Right triceps: 2+  Left triceps: 2+  Right patellar: 2+  Left patellar:  "2+  Right achilles: 2+  Left achilles: 2+  Right : 2+  Left : 2+       Vital Signs:   Vitals:    01/25/24 1446   BP: 126/58   Pulse: 62   SpO2: 93%   Weight: 89.4 kg (197 lb)   Height: 175.3 cm (69.02\")     Body mass index is 29.08 kg/m².     Results:   Imaging:   EMG & Nerve Conduction Test    Result Date: 1/18/2024  Peripheral neuropathy, chiefly sensory, axonal, mild This report is transcribed using the Dragon dictation system.       Assessment/Plan:   Diagnoses and all orders for this visit:    1. Polyneuropathy associated with underlying disease (Primary)  Comments:  Recommend increasing GBP to 300 TID. Check labs.  Discussed safety and need to check feet for wounds.    2. Numbness and tingling  -     Celiac Disease Panel; Future  -     CK; Future  -     Cryoglobulin; Future  -     Heavy Metals Profile II, Urine - Urine, Clean Catch; Future  -     JAZMIN + PE; Future  -     Lyme Disease, Line Blot; Future  -     Rheumatoid Arthritis (RA) Profile; Future  -     DANIELLE by IFA, Reflex 9-biomarkers profile; Future  -     Vitamin E; Future     Symptoms likely due to CF or treatment. Will follow up after labs.      Patient Education:    Reviewed medications, potential side effects and signs and symptoms to report. Discussed risk versus benefits of treatment plan with patient and/or family-including medications, labs and radiology that may be ordered. Addressed questions and concerns during visit. Patient and/or family verbalized understanding and agree with plan. Instructed to call the office with any questions and report to ER with any life-threatening symptoms.     Follow Up:   Return in about 3 months (around 4/25/2024) for Recheck.    During this visit the following were done:  Labs Reviewed [x]    Labs Ordered [x]    Radiology Reports Reviewed [x]    Radiology Ordered []    PCP Records Reviewed [x]    Referring Provider Records Reviewed [x]    ER Records Reviewed []    Hospital Records Reviewed []    History " Obtained From Family []    Radiology Images Reviewed []    Other Reviewed [x]    Records Requested [x]      Lore Mcmillan, BRANDAN, APRN

## 2024-01-25 NOTE — PROGRESS NOTES
Neuro Office Visit      Encounter Date: 2024   Patient Name: Ramírez Hinds  : 1945   MRN: [id number]   PCP: Best Watkins DO  Chief Complaint:    Chief Complaint   Patient presents with    Numbness and tingling       History of Present Illness: Ramírez Hinds is a 78 y.o. male who is here today in Neurology for numbness and tingling.    Paresthesia  78 year old white male diagnosed with cystic fibrosis 8 years ago managed by Dr Machuca at  [facility].  2010 developed pain in feet and trouble walking. Got new shoes at [employer] and symptoms improved but never resolved.  Initially feet feel heavy w decreased sensation. Feet feel swollen but are not swollen. Pain is achey and tender. Denies burning or electric shock sensation. Sensation up to ankles bilat.  Feel better to have socks on. Denies balance problems. No falls. No symptoms in fingers.  Has been treated with GBP and requip with good results. He is able to sleep at night. Very painful to miss a dose.  Denies falls and balance problems.  Healthy diet.  Taking MVI and vit B12 and vit B complex. States he has never had typical malabsorption problems of most CF patient.  Symptoms have been made worse by IV infusions of different antibiotics.  Taking alpha lipoic acid and GBP w ropinirole.    Started on new inhaler  Colistimethate inh 3 days ago and neuropathy is much worse.    EMG & Nerve Conduction Test (2024 13:30) -Peripheral neuropathy, chiefly sensory, axonal, mild     MRI Brain With & Without Contrast (2019 09:10)-neg    Vit D, it b12 > 2000, crp. No history of diabetes    PMH: htn, cystic fibrosis, neuropathy, GRD, BPH, anxiety, lung mass, Biallelic mutation of CFTR gene  FH:Alz dx, cad, DM  SH: former smoker, +etoh, -drug  Subjective      Past Medical History:   Past Medical History:   Diagnosis Date    Anxiety     Bronchiectasis     A.  Cystic saccular bronchiectasis, left upper lobe as well as lesser disease in  "right upper lobe and left low lobe.    Folliculitis     Fracture, carpal bone     Right    GERD (gastroesophageal reflux disease)     History of chest x-ray 01/25/2016    Heart size small and vertical. Mod elevation of the RT hemidiaphragm. LT PM artery is retracted superiorly and associated w/ reticulonodular increase in markings in the ADALBERTO.Also some fibrotic changes and RT apical capping in the RT apex, although not nearly as sig.Film very similar to prior study of 11/16/2010, amny dif explained by fact that today's film is light in comparison to one from 2010    History of chest x-ray 03/21/2011    Questionable progressive fibronodular disease in the LT chest w/ new elevation of the RT hemidiaphragm.Lateral fim does not show sig elevation of the diaphragms.    History of chest x-ray 10/22/2009    Diaphragms flattened, especially on LT.Lungs hyperinflated.Retraction of PM artery superiorly on LT.BTL fibronodular disease in the apices which appears chonic. Much > severe on LT and there is BTL apical capping.All appears chronic although no old film for comparison    History of peptic ulcer     History of PFTs 01/25/2016    Mod-Severe OAD w/ a drop in FEV1 from 1.92 L to 1.55L    History of PFTs 06/01/2015    Data is acceptable and reproducible.Given 3 puffs of xopenex. Gave a good effort. Pre Ref FVC 73%, FEV1 56%    History of PFTs 07/29/2014    Mod Obstructive lung disease.Improved since 1/2014 (1.6 to 1.82 L)    History of pneumonia      History of previous pneumonias and \" lung cavities\".    History of scarlet fever     Hypertension     Mycobacterium avium complex     A.  Cystic saccular bronchiectasis, left upper lobe as well as lesser disease in right upper lobe and left low lobe.    Nausea     Also has lost 9 pounds, some of which is due to some low-grade nausea     Neuritis     Peptic ulcer     Pneumonia     Pseudomonas aeruginosa infection     Patient's sputum of 06/02/2015 is positive for pseudomonas " aeruginosa that is resistant to Levaquin and patient is allergic to penicillin, patient started on tobramycin nebulizer treatments for 10 days.    Pulmonary nodule     Small spiculated right apical nodule stable on CT scan 2015 when compared to prior studies dating back to 2004.    Scarlet fever     Weakness      Has noted some weakness and malaise since on therapy.       Past Surgical History:   Past Surgical History:   Procedure Laterality Date    BRONCHOSCOPY N/A 2017    Procedure: BRONCHOSCOPY WITH FLUORO;  Surgeon: Mario Laguerre MD;  Location: [facility] ENDOSCOPY;  Service:     CATARACT EXTRACTION      EYE SURGERY      TONSILLECTOMY AND ADENOIDECTOMY         Family History:   Family History   Problem Relation Age of Onset    Alzheimer's disease Mother     Heart attack Father         MI    Other Father         Hx of CABG    No Known Problems Sister     No Known Problems Brother     Diabetes Other        Social History:   Social History     Socioeconomic History    Marital status:    Tobacco Use    Smoking status: Former     Packs/day: 0.50     Years: 4.00     Additional pack years: 0.00     Total pack years: 2.00     Types: Cigarettes     Quit date:      Years since quittin.1    Smokeless tobacco: Never    Tobacco comments:     Smoked briefly in college.   Substance and Sexual Activity    Alcohol use: Yes     Comment: Occasional/Social use    Drug use: No       Medications:     Current Outpatient Medications:     albuterol (PROVENTIL HFA;VENTOLIN HFA) 108 (90 BASE) MCG/ACT inhaler, ProAir  (90 Base) MCG/ACT Inhalation Aerosol Solution; Patient Sig: ProAir  (90 Base) MCG/ACT Inhalation Aerosol Solution INHALE 2 PUFFS FOUR TIMES A DAY FOR COUGH; 2015; Active, Disp: , Rfl:     albuterol (PROVENTIL) (2.5 MG/3ML) 0.083% nebulizer solution, USE 1 VIAL VIA NEBULIZATION EVERY 4 HOURS AS NEEDED FOR WHEEZING OR SHORTNESS OF BREATH, Disp: 360 mL, Rfl:  0    Alpha-Lipoic Acid 600 MG capsule, Take 600 mg by mouth Daily., Disp: 90 each, Rfl: 0    aspirin 325 MG EC tablet, Take 1 tablet by mouth., Disp: , Rfl:     b complex-C-folic acid 1 MG capsule, Take 1 capsule by mouth Daily., Disp: , Rfl:     Calcium Carb-Cholecalciferol (OS-CAROLE CALCIUM + D3) 500-200 MG-UNIT tablet, Take 2 tablets by mouth Daily., Disp: 60 tablet, Rfl: 11    COLISTIMETHATE SODIUM, CBA, IJ, Inhale 75 mg., Disp: , Rfl:     cyanocobalamin (VITAMIN B-12) 500 MCG tablet, Take 1 tablet by mouth Daily., Disp: , Rfl:     dilTIAZem CD (CARDIZEM CD) 360 MG 24 hr capsule, Take 1 capsule by mouth Daily., Disp: , Rfl:     dornase alpha (PULMOZYME) 2.5 MG/2.5ML nebulizer solution, Inhale 2.5 mg Daily., Disp: , Rfl:     Pcofttyl-Ayhjicd-Nrohmx&Ivacaf (Trikafta) 100-50-75 & 150 MG tablet therapy pack, Take  by mouth., Disp: , Rfl:     fluticasone (FLONASE) 50 MCG/ACT nasal spray, 2 sprays into the nostril(s) as directed by provider Daily., Disp: , Rfl:     folic acid (FOLVITE) 400 MCG tablet, Take 1 tablet by mouth Daily., Disp: , Rfl:     gabapentin (NEURONTIN) 100 MG capsule, TAKE 2 CAPSULES BY MOUTH IN THE AFTERNOON AND 1 IN ADDITION  MG CAPSULE AT BEDTIME, Disp: , Rfl:     gabapentin (NEURONTIN) 300 MG capsule, , Disp: , Rfl:     hydroCHLOROthiazide (HYDRODIURIL) 50 MG tablet, Take 1 tablet by mouth Daily., Disp: , Rfl:     ipratropium (ATROVENT) 0.03 % nasal spray, 2 sprays into the nostril(s) as directed by provider., Disp: , Rfl:     ketoconazole (NIZORAL) 2 % cream, APPLY TO THE AFFECTED AREA(S) BY TOPICAL ROUTE TWICE DAILY, Disp: , Rfl:     ketoconazole (NIZORAL) 2 % shampoo, APPLY TO THE AFFECTED AREA(S), LATHER, LEAVE IN PLACE FOR 5 MINUTES, AND THEN RINSE OFF WITH WATER BY TOPICAL ROUTE THREE TIMES PER WEEK., Disp: , Rfl:     Melatonin 1 MG capsule, Take 1 mg by mouth., Disp: , Rfl:     multivitamin with minerals tablet tablet, Take 1 tablet by mouth Daily., Disp: , Rfl:     omeprazole  "(priLOSEC) 40 MG capsule, TAKE 1 CAPSULE BY MOUTH EVERY DAY, Disp: 90 capsule, Rfl: 0    rOPINIRole (REQUIP) 1 MG tablet, Take 1 tablet by mouth. Take 2 in afternoon and 3 at night, Disp: , Rfl:     sodium chloride 3 % nebulizer solution, Inhale 4 mL Daily., Disp: , Rfl:     Darrel Podhaler 28 MG capsule, , Disp: , Rfl:     guaiFENesin (MUCINEX) 600 MG 12 hr tablet, Take 1 tablet by mouth. (Patient not taking: Reported on 1/25/2024), Disp: , Rfl:     Allergies:   Allergies   Allergen Reactions    Horse-Derived Products     Levofloxacin Dizziness and Other (See Comments)     Experienced dizziness, dazed/brain fog, and feeling like \"walking on stilts\" or leg numbness on 750 mg daily x 14 days. He stopped therapy on day 11 and woke up feeling fine the next day after stopping. Per Celso, consider using 500 mg dose next time and monitor closely for side effects.     Linezolid Other (See Comments)     Peripheral neuropathy       PHQ-9 Total Score:     STEADI Fall Risk Assessment was completed, and patient is at LOW risk for falls.Assessment completed on:1/25/2024    Objective     Physical Exam:   Physical Exam  Neurological:      Mental Status: He is oriented to person, place, and time.      Gait: Gait is intact.      Deep Tendon Reflexes:      Reflex Scores:       Tricep reflexes are 2+ on the right side and 2+ on the left side.       Bicep reflexes are 2+ on the right side and 2+ on the left side.       Brachioradialis reflexes are 2+ on the right side and 2+ on the left side.       Patellar reflexes are 2+ on the right side and 2+ on the left side.       Achilles reflexes are 2+ on the right side and 2+ on the left side.  Psychiatric:         Speech: Speech normal.         Neurologic Exam     Mental Status   Oriented to person, place, and time.   Follows 3 step commands.   Attention: normal. Concentration: normal.   Speech: speech is normal   Level of consciousness: alert  Knowledge: consistent with education.   Normal " "comprehension.     Cranial Nerves     CN III, IV, VI   Right pupil: Accommodation: intact.   Left pupil: Accommodation: intact.   CN III: no CN III palsy  CN VI: no CN VI palsy  Nystagmus: none   Diplopia: none  Upgaze: normal  Downgaze: normal  Conjugate gaze: present    CN VII   Facial expression full, symmetric.     CN VIII   Hearing: intact    Motor Exam   Muscle bulk: normal  Overall muscle tone: normal    Strength   Right biceps: 5/5  Left biceps: 5/5  Right triceps: 5/5  Left triceps: 5/5  Right interossei: 5/5  Left interossei: 5/5  Right quadriceps: 5/5  Left quadriceps: 5/5  Right anterior tibial: 5/5  Left anterior tibial: 5/5  Right posterior tibial: 5/5  Left posterior tibial: 5/5    Sensory Exam   Light touch normal.   Right arm light touch: normal  Left arm light touch: normal  Right leg light touch: normal  Left leg light touch: normal  Right arm vibration: normal  Left arm vibration: decreased from elbow  Right leg vibration: decreased from toes  Left leg vibration: decreased from toes    Gait, Coordination, and Reflexes     Gait  Gait: normal    Tremor   Resting tremor: absent  Action tremor: absent    Reflexes   Right brachioradialis: 2+  Left brachioradialis: 2+  Right biceps: 2+  Left biceps: 2+  Right triceps: 2+  Left triceps: 2+  Right patellar: 2+  Left patellar: 2+  Right achilles: 2+  Left achilles: 2+  Right : 2+  Left : 2+       Vital Signs:   Vitals:    01/25/24 1446   BP: 126/58   Pulse: 62   SpO2: 93%   Weight: 89.4 kg (197 lb)   Height: 175.3 cm (69.02\")     Body mass index is 29.08 kg/m².     Results:   Imaging:   EMG & Nerve Conduction Test    Result Date: 1/18/2024  Peripheral neuropathy, chiefly sensory, axonal, mild This report is transcribed using the Dragon dictation system.       Assessment/Plan:   Diagnoses and all orders for this visit:    1. Polyneuropathy associated with underlying disease (Primary)  Comments:  Recommend increasing GBP to 300 TID. Check " labs.  Discussed safety and need to check feet for wounds.    2. Numbness and tingling  -     Celiac Disease Panel; Future  -     CK; Future  -     Cryoglobulin; Future  -     Heavy Metals Profile II, Urine - Urine, Clean Catch; Future  -     JAZMIN + PE; Future  -     Lyme Disease, Line Blot; Future  -     Rheumatoid Arthritis (RA) Profile; Future  -     DANIELLE by IFA, Reflex 9-biomarkers profile; Future  -     Vitamin E; Future     Symptoms likely due to CF or treatment. Will follow up after labs.      Patient Education:    Reviewed medications, potential side effects and signs and symptoms to report. Discussed risk versus benefits of treatment plan with patient and/or family-including medications, labs and radiology that may be ordered. Addressed questions and concerns during visit. Patient and/or family verbalized understanding and agree with plan. Instructed to call the office with any questions and report to ER with any life-threatening symptoms.     Follow Up:   Return in about 3 months (around 4/25/2024) for Recheck.    During this visit the following were done:  Labs Reviewed [x]    Labs Ordered [x]    Radiology Reports Reviewed [x]    Radiology Ordered []    PCP Records Reviewed [x]    Referring Provider Records Reviewed [x]    ER Records Reviewed []    Hospital Records Reviewed []    History Obtained From Family []    Radiology Images Reviewed []    Other Reviewed [x]    Records Requested [x]      Lore Mcmillan, BRANDAN, APRN

## 2024-01-27 LAB
CCP IGA+IGG SERPL IA-ACNC: 6 UNITS (ref 0–19)
RHEUMATOID FACT SERPL-ACNC: 11 IU/ML

## 2024-01-29 LAB
ALBUMIN SERPL ELPH-MCNC: 4 G/DL (ref 2.9–4.4)
ALBUMIN/GLOB SERPL: 1.3 {RATIO} (ref 0.7–1.7)
ALPHA1 GLOB SERPL ELPH-MCNC: 0.2 G/DL (ref 0–0.4)
ALPHA2 GLOB SERPL ELPH-MCNC: 0.9 G/DL (ref 0.4–1)
B-GLOBULIN SERPL ELPH-MCNC: 1.2 G/DL (ref 0.7–1.3)
ENDOMYSIUM IGA SER QL: NEGATIVE
GAMMA GLOB SERPL ELPH-MCNC: 0.8 G/DL (ref 0.4–1.8)
GLOBULIN SER-MCNC: 3.1 G/DL (ref 2.2–3.9)
IGA SERPL-MCNC: 309 MG/DL (ref 61–437)
IGA SERPL-MCNC: 317 MG/DL (ref 61–437)
IGG SERPL-MCNC: 926 MG/DL (ref 603–1613)
IGM SERPL-MCNC: 62 MG/DL (ref 15–143)
INTERPRETATION SERPL IEP-IMP: NORMAL
LABORATORY COMMENT REPORT: NORMAL
M PROTEIN SERPL ELPH-MCNC: NORMAL G/DL
PROT SERPL-MCNC: 7.1 G/DL (ref 6–8.5)
TTG IGA SER-ACNC: <2 U/ML (ref 0–3)

## 2024-01-30 LAB
ANA SER QL IF: POSITIVE
ANA SPECKLED TITR SER: ABNORMAL {TITER}
ARSENIC UR-MCNC: NORMAL UG/L (ref 0–9)
B BURGDOR IGG PATRN SER IB-IMP: NEGATIVE
B BURGDOR IGM PATRN SER IB-IMP: NEGATIVE
B BURGDOR18KD IGG SER QL IB: ABNORMAL
B BURGDOR23KD IGG SER QL IB: ABNORMAL
B BURGDOR23KD IGM SER QL IB: ABNORMAL
B BURGDOR28KD IGG SER QL IB: ABNORMAL
B BURGDOR30KD IGG SER QL IB: ABNORMAL
B BURGDOR39KD IGG SER QL IB: ABNORMAL
B BURGDOR39KD IGM SER QL IB: ABNORMAL
B BURGDOR41KD IGG SER QL IB: PRESENT
B BURGDOR41KD IGM SER QL IB: ABNORMAL
B BURGDOR45KD IGG SER QL IB: ABNORMAL
B BURGDOR58KD IGG SER QL IB: PRESENT
B BURGDOR66KD IGG SER QL IB: ABNORMAL
B BURGDOR93KD IGG SER QL IB: ABNORMAL
CADMIUM 24H UR-MCNC: NORMAL UG/L
CENTROMERE B AB SER-ACNC: <0.2 AI (ref 0–0.9)
CHROMATIN AB SERPL-ACNC: <0.2 AI (ref 0–0.9)
CREAT UR-MCNC: 0.6 G/L (ref 0.3–3)
DSDNA AB SER-ACNC: 1 IU/ML (ref 0–9)
ENA JO1 AB SER-ACNC: <0.2 AI (ref 0–0.9)
ENA RNP AB SER-ACNC: <0.2 AI (ref 0–0.9)
ENA SCL70 AB SER-ACNC: <0.2 AI (ref 0–0.9)
ENA SM AB SER-ACNC: <0.2 AI (ref 0–0.9)
ENA SS-A AB SER-ACNC: <0.2 AI (ref 0–0.9)
ENA SS-B AB SER-ACNC: <0.2 AI (ref 0–0.9)
LABORATORY COMMENT REPORT: ABNORMAL
LEAD 24H UR-MCNC: NORMAL UG/L (ref 0–49)
Lab: ABNORMAL
Lab: ABNORMAL
MERCURY 24H UR-MCNC: 1 UG/L (ref 0–19)
MERCURY/CREAT UR: 2 UG/G CREAT (ref 0–5)

## 2024-02-01 LAB — CRYOGLOB SER QL 1D COLD INC: NORMAL

## 2024-02-02 ENCOUNTER — TELEPHONE (OUTPATIENT)
Dept: NEUROLOGY | Facility: CLINIC | Age: 79
End: 2024-02-02
Payer: MEDICARE

## 2024-02-02 NOTE — TELEPHONE ENCOUNTER
Called patient and gave results.  Patient was understanding and appreciative.    ----- Message from Lore Mcmillan DNP, APRN sent at 2/1/2024  4:51 PM EST -----  Please notify pt labs for cyroglobulins, lyme disease, heavy metal toxins, immune levels, celiac test, rheumatoid arthritis, muscle breakdown are all normal.  initial DANIELLE test for autoimmune connective tissue diseases was positive but follow up antibody testing was negative. 30%  of the population will have a positive DANIELLE with no disease.

## 2024-03-01 ENCOUNTER — LAB (OUTPATIENT)
Dept: LAB | Facility: HOSPITAL | Age: 79
End: 2024-03-01
Payer: MEDICARE

## 2024-03-01 DIAGNOSIS — R20.2 NUMBNESS AND TINGLING: ICD-10-CM

## 2024-03-01 DIAGNOSIS — R20.0 NUMBNESS AND TINGLING: ICD-10-CM

## 2024-03-01 PROCEDURE — 36415 COLL VENOUS BLD VENIPUNCTURE: CPT

## 2024-03-01 PROCEDURE — 84446 ASSAY OF VITAMIN E: CPT

## 2024-03-08 LAB
A-TOCOPHEROL VIT E SERPL-MCNC: 25 MG/L (ref 9–29)
GAMMA TOCOPHEROL SERPL-MCNC: 0.3 MG/L (ref 0.5–4.9)

## 2024-03-11 ENCOUNTER — TELEPHONE (OUTPATIENT)
Dept: NEUROLOGY | Facility: CLINIC | Age: 79
End: 2024-03-11
Payer: MEDICARE

## 2024-03-11 NOTE — TELEPHONE ENCOUNTER
Called patient and gave results.  Patient was understanding and appreciative.    ----- Message from Lore Mcmillan DNP, APRN sent at 3/8/2024  5:18 PM EST -----  Please notify pt that his vit E gamma level was slightly low. Vit E alpha level is normal. We focus more on Vit E alpha. No change in plan of care.

## 2024-04-29 ENCOUNTER — OFFICE VISIT (OUTPATIENT)
Dept: NEUROLOGY | Facility: CLINIC | Age: 79
End: 2024-04-29
Payer: MEDICARE

## 2024-04-29 VITALS
HEIGHT: 69 IN | BODY MASS INDEX: 29.18 KG/M2 | OXYGEN SATURATION: 95 % | DIASTOLIC BLOOD PRESSURE: 60 MMHG | HEART RATE: 66 BPM | SYSTOLIC BLOOD PRESSURE: 140 MMHG | WEIGHT: 197 LBS

## 2024-04-29 DIAGNOSIS — G63 POLYNEUROPATHY ASSOCIATED WITH UNDERLYING DISEASE: Primary | ICD-10-CM

## 2024-04-29 PROCEDURE — 1160F RVW MEDS BY RX/DR IN RCRD: CPT | Performed by: NURSE PRACTITIONER

## 2024-04-29 PROCEDURE — 1159F MED LIST DOCD IN RCRD: CPT | Performed by: NURSE PRACTITIONER

## 2024-04-29 PROCEDURE — 3078F DIAST BP <80 MM HG: CPT | Performed by: NURSE PRACTITIONER

## 2024-04-29 PROCEDURE — 3077F SYST BP >= 140 MM HG: CPT | Performed by: NURSE PRACTITIONER

## 2024-04-29 PROCEDURE — 99213 OFFICE O/P EST LOW 20 MIN: CPT | Performed by: NURSE PRACTITIONER

## 2024-04-29 RX ORDER — AZITHROMYCIN 250 MG/1
250 TABLET, FILM COATED ORAL DAILY
COMMUNITY
Start: 2024-04-22

## 2024-04-29 RX ORDER — VALSARTAN 160 MG/1
160 TABLET ORAL DAILY
COMMUNITY

## 2024-04-29 RX ORDER — ACETAMINOPHEN 325 MG/1
650 TABLET ORAL EVERY 6 HOURS PRN
COMMUNITY

## 2024-04-29 RX ORDER — IBUPROFEN 800 MG/1
1 TABLET ORAL 3 TIMES DAILY PRN
COMMUNITY
End: 2024-04-29

## 2024-04-29 NOTE — PROGRESS NOTES
Neuro Office Visit      Encounter Date: 2024   Patient Name: Ramírez Hinds  : 1945   MRN: [id number]   PCP: Dr Watkins  Chief Complaint:    Chief Complaint   Patient presents with    Polyneuropathy associated with underlying disease       History of Present Illness: Ramírez Hinds is a 78 y.o. male who is here today in Neurology for  polyneuropathy, paresthesia.    Last visit 2024 w [facility], check labs, routine foot check.  Symptoms likely due to CF or treatment.    Labs:celiac, ck, cryoglobulin, heavy metals, JAZMIN, lyme, RA, DANIELLE+ but neg abx    Paresthesia    Pt is doing well. No change in symptoms.  Taking GBP. No side effects. Sleeping well. No falls. Checking his feet for wounds.  PH  78 year old white male diagnosed with cystic fibrosis 8 years ago managed by Dr Machuca at  Pulmonolgy.  2010 developed pain in feet and trouble walking. Got new shoes at [facility] and symptoms improved but never resolved.  Initially feet feel heavy w decreased sensation. Feet feel swollen but are not swollen. Pain is achey and tender. Denies burning or electric shock sensation. Sensation up to ankles bilat.  Feel better to have socks on. Denies balance problems. No falls. No symptoms in fingers.  Has been treated with GBP and requip with good results. He is able to sleep at night. Very painful to miss a dose.  Denies falls and balance problems.  Healthy diet.  Taking MVI and vit B12 and vit B complex. States he has never had typical malabsorption problems of most CF patient.  Symptoms have been made worse by IV infusions of different antibiotics.  Taking alpha lipoic acid and GBP w ropinirole.     Started on new inhaler  Colistimethate inh 3 days ago and neuropathy is much worse.     EMG & Nerve Conduction Test (2024 13:30) -Peripheral neuropathy, chiefly sensory, axonal, mild      MRI Brain With & Without Contrast (2019 09:10)-neg     Vit D, it b12 > 2000, crp.  "No history of diabetes     PMH: htn, cystic fibrosis, neuropathy, GRD, BPH, anxiety, lung mass, Biallelic mutation of CFTR gene  FH:Alz dx, cad, DM  SH: former smoker, +etoh, -drug  Subjective      Past Medical History:   Past Medical History:   Diagnosis Date    Anxiety     Bronchiectasis     A.  Cystic saccular bronchiectasis, left upper lobe as well as lesser disease in right upper lobe and left low lobe.    Folliculitis     Fracture, carpal bone     Right    GERD (gastroesophageal reflux disease)     History of chest x-ray 01/25/2016    Heart size small and vertical. Mod elevation of the RT hemidiaphragm. LT PM artery is retracted superiorly and associated w/ reticulonodular increase in markings in the ADALBERTO.Also some fibrotic changes and RT apical capping in the RT apex, although not nearly as sig.Film very similar to prior study of 11/16/2010, amny dif explained by fact that today's film is light in comparison to one from 2010    History of chest x-ray 03/21/2011    Questionable progressive fibronodular disease in the LT chest w/ new elevation of the RT hemidiaphragm.Lateral fim does not show sig elevation of the diaphragms.    History of chest x-ray 10/22/2009    Diaphragms flattened, especially on LT.Lungs hyperinflated.Retraction of PM artery superiorly on LT.BTL fibronodular disease in the apices which appears chonic. Much > severe on LT and there is BTL apical capping.All appears chronic although no old film for comparison    History of peptic ulcer     History of PFTs 01/25/2016    Mod-Severe OAD w/ a drop in FEV1 from 1.92 L to 1.55L    History of PFTs 06/01/2015    Data is acceptable and reproducible.Given 3 puffs of xopenex. Gave a good effort. Pre Ref FVC 73%, FEV1 56%    History of PFTs 07/29/2014    Mod Obstructive lung disease.Improved since 1/2014 (1.6 to 1.82 L)    History of pneumonia      History of previous pneumonias and \" lung cavities\".    History of scarlet fever     Hypertension     " Mycobacterium avium complex     A.  Cystic saccular bronchiectasis, left upper lobe as well as lesser disease in right upper lobe and left low lobe.    Nausea     Also has lost 9 pounds, some of which is due to some low-grade nausea     Neuritis     Peptic ulcer     Pneumonia     Pseudomonas aeruginosa infection     Patient's sputum of 2015 is positive for pseudomonas aeruginosa that is resistant to Levaquin and patient is allergic to penicillin, patient started on tobramycin nebulizer treatments for 10 days.    Pulmonary nodule     Small spiculated right apical nodule stable on CT scan 2015 when compared to prior studies dating back to 2004.    Scarlet fever     Weakness      Has noted some weakness and malaise since on therapy.       Past Surgical History:   Past Surgical History:   Procedure Laterality Date    BRONCHOSCOPY N/A 2017    Procedure: BRONCHOSCOPY WITH FLUORO;  Surgeon: Mario Laguerre MD;  Location: [facility] ENDOSCOPY;  Service:     CATARACT EXTRACTION      EYE SURGERY      TONSILLECTOMY AND ADENOIDECTOMY         Family History:   Family History   Problem Relation Age of Onset    Alzheimer's disease Mother     Heart attack Father         MI    Other Father         Hx of CABG    No Known Problems Sister     No Known Problems Brother     Diabetes Other        Social History:   Social History     Socioeconomic History    Marital status:    Tobacco Use    Smoking status: Former     Current packs/day: 0.00     Average packs/day: 0.5 packs/day for 4.0 years (2.0 ttl pk-yrs)     Types: Cigarettes     Start date:      Quit date:      Years since quittin.3    Smokeless tobacco: Never    Tobacco comments:     Smoked briefly in college.   Substance and Sexual Activity    Alcohol use: Yes     Comment: Occasional/Social use    Drug use: No       Medications:     Current Outpatient Medications:     acetaminophen (TYLENOL) 325 MG tablet, Take 2 tablets by mouth  Every 6 (Six) Hours As Needed for Mild Pain., Disp: , Rfl:     albuterol (PROVENTIL HFA;VENTOLIN HFA) 108 (90 BASE) MCG/ACT inhaler, ProAir  (90 Base) MCG/ACT Inhalation Aerosol Solution; Patient Sig: ProAir  (90 Base) MCG/ACT Inhalation Aerosol Solution INHALE 2 PUFFS FOUR TIMES A DAY FOR COUGH; 1; 11; 02-Jun-2015; Active, Disp: , Rfl:     albuterol (PROVENTIL) (2.5 MG/3ML) 0.083% nebulizer solution, USE 1 VIAL VIA NEBULIZATION EVERY 4 HOURS AS NEEDED FOR WHEEZING OR SHORTNESS OF BREATH, Disp: 360 mL, Rfl: 0    Alpha-Lipoic Acid 600 MG capsule, Take 600 mg by mouth Daily., Disp: 90 each, Rfl: 0    azithromycin (ZITHROMAX) 250 MG tablet, Take 1 tablet by mouth Daily., Disp: , Rfl:     Aztreonam Lysine 75 MG reconstituted solution, Inhale 75 mg 3 (Three) Times a Day., Disp: , Rfl:     b complex-C-folic acid 1 MG capsule, Take 1 capsule by mouth Daily., Disp: , Rfl:     Calcium Carb-Cholecalciferol (OS-CAROLE CALCIUM + D3) 500-200 MG-UNIT tablet, Take 2 tablets by mouth Daily., Disp: 60 tablet, Rfl: 11    dornase alpha (PULMOZYME) 2.5 MG/2.5ML nebulizer solution, Inhale 2.5 mg Daily., Disp: , Rfl:     Swxhixai-Ddjctic-Ebzxrr&Ivacaf (Trikafta) 100-50-75 & 150 MG tablet therapy pack, Take  by mouth., Disp: , Rfl:     fluticasone (FLONASE) 50 MCG/ACT nasal spray, 2 sprays into the nostril(s) as directed by provider Daily., Disp: , Rfl:     folic acid (FOLVITE) 400 MCG tablet, Take 1 tablet by mouth Daily., Disp: , Rfl:     gabapentin (NEURONTIN) 100 MG capsule, TAKE 2 CAPSULES BY MOUTH IN THE AFTERNOON AND 1 IN ADDITION  MG CAPSULE AT BEDTIME, Disp: , Rfl:     gabapentin (NEURONTIN) 300 MG capsule, , Disp: , Rfl:     guaiFENesin (MUCINEX) 600 MG 12 hr tablet, Take 1 tablet by mouth., Disp: , Rfl:     hydroCHLOROthiazide (HYDRODIURIL) 50 MG tablet, Take 1 tablet by mouth Daily., Disp: , Rfl:     ipratropium (ATROVENT) 0.03 % nasal spray, 2 sprays into the nostril(s) as directed by provider., Disp: , Rfl:  "    ketoconazole (NIZORAL) 2 % cream, APPLY TO THE AFFECTED AREA(S) BY TOPICAL ROUTE TWICE DAILY, Disp: , Rfl:     ketoconazole (NIZORAL) 2 % shampoo, APPLY TO THE AFFECTED AREA(S), LATHER, LEAVE IN PLACE FOR 5 MINUTES, AND THEN RINSE OFF WITH WATER BY TOPICAL ROUTE THREE TIMES PER WEEK., Disp: , Rfl:     Melatonin 1 MG capsule, Take 1 mg by mouth., Disp: , Rfl:     multivitamin with minerals tablet tablet, Take 1 tablet by mouth Daily., Disp: , Rfl:     omeprazole (priLOSEC) 40 MG capsule, TAKE 1 CAPSULE BY MOUTH EVERY DAY, Disp: 90 capsule, Rfl: 0    rivaroxaban (XARELTO) 20 MG tablet, Take 1 tablet by mouth Daily With Dinner., Disp: , Rfl:     rOPINIRole (REQUIP) 1 MG tablet, Take 1 tablet by mouth. Take 2 in afternoon and 3 at night, Disp: , Rfl:     sodium chloride 3 % nebulizer solution, Inhale 4 mL Daily., Disp: , Rfl:     Darrel Podhaler 28 MG capsule, , Disp: , Rfl:     valsartan (DIOVAN) 160 MG tablet, Take 1 tablet by mouth Daily., Disp: , Rfl:     Allergies:   Allergies   Allergen Reactions    Horse-Derived Products     Levofloxacin Dizziness and Other (See Comments)     Experienced dizziness, dazed/brain fog, and feeling like \"walking on stilts\" or leg numbness on 750 mg daily x 14 days. He stopped therapy on day 11 and woke up feeling fine the next day after stopping. Per Celso, consider using 500 mg dose next time and monitor closely for side effects.     Linezolid Other (See Comments)     Peripheral neuropathy       PHQ-9 Total Score:     STEADI Fall Risk Assessment was completed, and patient is at LOW risk for falls.Assessment completed on:4/29/2024    Objective     Physical Exam:   Physical Exam  Neurological:      Mental Status: He is oriented to person, place, and time.      Gait: Gait is intact.      Deep Tendon Reflexes:      Reflex Scores:       Bicep reflexes are 2+ on the right side and 2+ on the left side.       Brachioradialis reflexes are 2+ on the right side and 2+ on the left side.       " "Patellar reflexes are 2+ on the right side and 2+ on the left side.       Achilles reflexes are 2+ on the right side and 2+ on the left side.  Psychiatric:         Speech: Speech normal.         Neurologic Exam     Mental Status   Oriented to person, place, and time.   Follows 3 step commands.   Attention: normal. Concentration: normal.   Speech: speech is normal   Level of consciousness: alert  Knowledge: consistent with education.   Normal comprehension.     Cranial Nerves     CN III, IV, VI   CN III: no CN III palsy  CN VI: no CN VI palsy  Nystagmus: none   Diplopia: none  Upgaze: normal  Downgaze: normal  Conjugate gaze: present    CN VII   Facial expression full, symmetric.     CN VIII   Hearing: intact    CN XII   CN XII normal.     Motor Exam   Muscle bulk: normal  Overall muscle tone: normal    Strength   Right biceps: 5/5  Left biceps: 5/5  Right triceps: 5/5  Left triceps: 5/5  Right interossei: 5/5  Left interossei: 5/5  Right quadriceps: 5/5  Left quadriceps: 5/5  Right anterior tibial: 5/5  Left anterior tibial: 5/5  Right posterior tibial: 5/5  Left posterior tibial: 5/5    Sensory Exam   Right arm light touch: normal  Left arm light touch: normal  Right leg light touch: decreased from toes  Left leg light touch: decreased from toes    Gait, Coordination, and Reflexes     Gait  Gait: normal    Tremor   Resting tremor: absent  Action tremor: absent    Reflexes   Right brachioradialis: 2+  Left brachioradialis: 2+  Right biceps: 2+  Left biceps: 2+  Right patellar: 2+  Left patellar: 2+  Right achilles: 2+  Left achilles: 2+  Right : 2+  Left : 2+       Vital Signs:   Vitals:    04/29/24 0923   BP: 140/60   Pulse: 66   SpO2: 95%   Weight: 89.4 kg (197 lb)   Height: 175.3 cm (69.02\")     Body mass index is 29.08 kg/m².         Assessment / Plan      Assessment/Plan:   Diagnoses and all orders for this visit:    1. Polyneuropathy associated with underlying disease (Primary)  Comments:  Cont GBP. " Use cane for any balance problems. Check foot for wounds.         FU prn    Patient Education:       Reviewed medications, potential side effects and signs and symptoms to report. Discussed risk versus benefits of treatment plan with patient and/or family-including medications, labs and radiology that may be ordered. Addressed questions and concerns during visit. Patient and/or family verbalized understanding and agree with plan. Instructed to call the office with any questions and report to ER with any life-threatening symptoms.     Follow Up:   Return if symptoms worsen or fail to improve.    During this visit the following were done:  Labs Reviewed [x]    Labs Ordered []    Radiology Reports Reviewed [x]    Radiology Ordered []    PCP Records Reviewed [x]    Referring Provider Records Reviewed []    ER Records Reviewed []    Hospital Records Reviewed []    History Obtained From Family []    Radiology Images Reviewed []    Other Reviewed [x]    Records Requested []      Lore Mcmillan, DNP, APRN

## 2024-05-31 ENCOUNTER — READMISSION MANAGEMENT (OUTPATIENT)
Dept: CALL CENTER | Facility: HOSPITAL | Age: 79
End: 2024-05-31
Payer: MEDICARE

## 2024-06-01 NOTE — OUTREACH NOTE
Prep Survey      Flowsheet Row Responses   [facility] facility patient discharged from? Non-BH   Is LACE score < 7 ? Non-BH Discharge   Eligibility [facility]   Date of Admission 05/28/24   Date of Discharge 05/31/24   Discharge Disposition Home or Self Care   Discharge diagnosis Cystic fibrosis   Does the patient have one of the following disease processes/diagnoses(primary or secondary)? Other   Does the patient have Home health ordered? No   Is there a DME ordered? No   Prep survey completed? Yes            SAJAN COLEMAN - Registered Nurse

## 2024-06-03 ENCOUNTER — TRANSITIONAL CARE MANAGEMENT TELEPHONE ENCOUNTER (OUTPATIENT)
Dept: CALL CENTER | Facility: HOSPITAL | Age: 79
End: 2024-06-03
Payer: MEDICARE

## 2024-06-03 NOTE — OUTREACH NOTE
Call Center TCM Note      Flowsheet Row Responses   [facility] patient discharged from? Non-   Does the patient have one of the following disease processes/diagnoses(primary or secondary)? Other   TCM attempt successful? Yes   Call start time 1539   Call end time 1541   Discharge diagnosis Cystic fibrosis   Person spoke with today (if not patient) and relationship patient   Meds reviewed with patient/caregiver? Yes   Is the patient having any side effects they believe may be caused by any medication additions or changes? No   Does the patient have all medications ordered at discharge? Yes   Is the patient taking all medications as directed (includes completed medication regime)? Yes   Comments Scheduled a hospital followup with Dr Watkins on June 14th at 945 am . Patient was hospitalized at [facility]   Does the patient have an appointment with their PCP within 7-14 days of discharge? Yes   Psychosocial issues? No   Did the patient receive a copy of their discharge instructions? Yes   Nursing interventions Reviewed instructions with patient   What is the patient's perception of their health status since discharge? Improving   Is the patient/caregiver able to teach back signs and symptoms related to disease process for when to call PCP? Yes   Is the patient/caregiver able to teach back signs and symptoms related to disease process for when to call 911? Yes   Is the patient/caregiver able to teach back the hierarchy of who to call/visit for symptoms/problems? PCP, Specialist, Home health nurse, Urgent Care, ED, 911 Yes   If the patient is a current smoker, are they able to teach back resources for cessation? Not a smoker   TCM call completed? Yes   Call end time 1541   Would this patient benefit from a Referral to Amb Social Work? No   Is the patient interested in additional calls from an ambulatory ? No            Roverto Negro RN    6/3/2024, 15:42 EDT

## 2024-06-14 ENCOUNTER — OFFICE VISIT (OUTPATIENT)
Dept: FAMILY MEDICINE CLINIC | Facility: CLINIC | Age: 79
End: 2024-06-14
Payer: MEDICARE

## 2024-06-14 VITALS
BODY MASS INDEX: 29.47 KG/M2 | OXYGEN SATURATION: 96 % | DIASTOLIC BLOOD PRESSURE: 70 MMHG | HEIGHT: 69 IN | SYSTOLIC BLOOD PRESSURE: 124 MMHG | HEART RATE: 67 BPM | WEIGHT: 199 LBS | TEMPERATURE: 97.9 F

## 2024-06-14 DIAGNOSIS — I48.11 LONGSTANDING PERSISTENT ATRIAL FIBRILLATION: ICD-10-CM

## 2024-06-14 DIAGNOSIS — E84.0 CYSTIC FIBROSIS OF THE LUNG: ICD-10-CM

## 2024-06-14 DIAGNOSIS — Z09 HOSPITAL DISCHARGE FOLLOW-UP: Primary | ICD-10-CM

## 2024-06-14 PROCEDURE — 1111F DSCHRG MED/CURRENT MED MERGE: CPT | Performed by: FAMILY MEDICINE

## 2024-06-14 PROCEDURE — 99495 TRANSJ CARE MGMT MOD F2F 14D: CPT | Performed by: FAMILY MEDICINE

## 2024-06-14 PROCEDURE — 3078F DIAST BP <80 MM HG: CPT | Performed by: FAMILY MEDICINE

## 2024-06-14 PROCEDURE — 1126F AMNT PAIN NOTED NONE PRSNT: CPT | Performed by: FAMILY MEDICINE

## 2024-06-14 PROCEDURE — 3074F SYST BP LT 130 MM HG: CPT | Performed by: FAMILY MEDICINE

## 2024-06-14 RX ORDER — VALSARTAN 320 MG/1
320 TABLET ORAL DAILY
COMMUNITY
Start: 2024-06-11

## 2024-06-14 RX ORDER — DILTIAZEM HYDROCHLORIDE 360 MG/1
CAPSULE, EXTENDED RELEASE ORAL
COMMUNITY
Start: 2024-05-07

## 2024-06-14 NOTE — PROGRESS NOTES
Transitional Care Follow Up Visit     Patient Name: Ramírez Hinds  : 1945   MRN: [id number]     Chief Complaint:    Chief Complaint   Patient presents with    Transitional Care Management     Wants to do wellness visit.       History of Present Illness: Ramírez Hinds is a 78 y.o. male who presents today for transitional care management visit.  The patient was contacted by our office within 48 hours after the discharge of the patient via telephone to coordinate their follow up care and needs.  Patient was admitted because he has a cystic fibrosis.  Patient had decreased lung function but overall was not too sick.  Patient was treated with antibiotics and was released after 4 days.  He reports lung function did not improve.  He has follow-up with his pulmonologist at .  Blood pressure medication was decreased, but has been increased back to the normal medication as patient blood pressure was uncontrolled after discharge from hospital.  Patient reports compliance to medications overall and has no concerns today.    Subjective      Review of Systems:   Review of Systems   Respiratory:  Positive for shortness of breath.        I reviewed and discussed the details of that call along with the discharge summary, hospital problems, inpatient lab results, inpatient diagnostic studies, and consultation reports with Ramírez Hinds.     Current outpatient and discharge medications have been reconciled for the patient.      2024     8:43 PM   Date of TCM Phone Call   Hospital [facility]   Date of Admission 2024   Date of Discharge 2024   Discharge Disposition Home or Self Care       Medications:     Current Outpatient Medications:     acetaminophen (TYLENOL) 325 MG tablet, Take 2 tablets by mouth Every 6 (Six) Hours As Needed for Mild Pain., Disp: , Rfl:     albuterol (PROVENTIL HFA;VENTOLIN HFA) 108 (90 BASE) MCG/ACT inhaler, ProAir  (90 Base) MCG/ACT Inhalation Aerosol Solution; Patient Sig:  ProAir  (90 Base) MCG/ACT Inhalation Aerosol Solution INHALE 2 PUFFS FOUR TIMES A DAY FOR COUGH; 1; 11; 02-Jun-2015; Active, Disp: , Rfl:     albuterol (PROVENTIL) (2.5 MG/3ML) 0.083% nebulizer solution, USE 1 VIAL VIA NEBULIZATION EVERY 4 HOURS AS NEEDED FOR WHEEZING OR SHORTNESS OF BREATH, Disp: 360 mL, Rfl: 0    b complex-C-folic acid 1 MG capsule, Take 1 capsule by mouth Daily., Disp: , Rfl:     Calcium Carb-Cholecalciferol (OS-CAROLE CALCIUM + D3) 500-200 MG-UNIT tablet, Take 2 tablets by mouth Daily., Disp: 60 tablet, Rfl: 11    cyanocobalamin (VITAMIN B-12) 1000 MCG tablet, Take 1 tablet by mouth Daily., Disp: , Rfl:     dilTIAZem CD (CARDIZEM CD) 360 MG 24 hr capsule, , Disp: , Rfl:     dornase alpha (PULMOZYME) 2.5 MG/2.5ML nebulizer solution, Inhale 2.5 mg Daily., Disp: , Rfl:     Rjojdoyk-Arfsxrz-Lkkmpv&Ivacaf (Trikafta) 100-50-75 & 150 MG tablet therapy pack, Take  by mouth., Disp: , Rfl:     fluticasone (FLONASE) 50 MCG/ACT nasal spray, 2 sprays into the nostril(s) as directed by provider Daily., Disp: , Rfl:     gabapentin (NEURONTIN) 100 MG capsule, TAKE 2 CAPSULES BY MOUTH IN THE AFTERNOON AND 1 IN ADDITION  MG CAPSULE AT BEDTIME, Disp: , Rfl:     gabapentin (NEURONTIN) 300 MG capsule, , Disp: , Rfl:     guaiFENesin (MUCINEX) 600 MG 12 hr tablet, Take 1 tablet by mouth., Disp: , Rfl:     hydroCHLOROthiazide (HYDRODIURIL) 50 MG tablet, Take 1 tablet by mouth Daily., Disp: , Rfl:     ipratropium (ATROVENT) 0.03 % nasal spray, 2 sprays into the nostril(s) as directed by provider., Disp: , Rfl:     ketoconazole (NIZORAL) 2 % cream, APPLY TO THE AFFECTED AREA(S) BY TOPICAL ROUTE TWICE DAILY, Disp: , Rfl:     ketoconazole (NIZORAL) 2 % shampoo, APPLY TO THE AFFECTED AREA(S), LATHER, LEAVE IN PLACE FOR 5 MINUTES, AND THEN RINSE OFF WITH WATER BY TOPICAL ROUTE THREE TIMES PER WEEK., Disp: , Rfl:     Melatonin 1 MG capsule, Take 1 mg by mouth., Disp: , Rfl:     multivitamin with minerals tablet  "tablet, Take 1 tablet by mouth Daily., Disp: , Rfl:     omeprazole (priLOSEC) 40 MG capsule, TAKE 1 CAPSULE BY MOUTH EVERY DAY, Disp: 90 capsule, Rfl: 0    rivaroxaban (XARELTO) 20 MG tablet, Take 1 tablet by mouth Daily With Dinner., Disp: , Rfl:     rOPINIRole (REQUIP) 1 MG tablet, Take 1 tablet by mouth. Take 2 in afternoon and 3 at night, Disp: , Rfl:     sodium chloride 3 % nebulizer solution, Inhale 4 mL Daily., Disp: , Rfl:     Darrel Podhaler 28 MG capsule, , Disp: , Rfl:     valsartan (DIOVAN) 320 MG tablet, Take 1 tablet by mouth Daily., Disp: , Rfl:     Aztreonam Lysine 75 MG reconstituted solution, Inhale 75 mg 3 (Three) Times a Day., Disp: , Rfl:     Allergies:   Allergies   Allergen Reactions    Horse-Derived Products     Levofloxacin Dizziness and Other (See Comments)     Experienced dizziness, dazed/brain fog, and feeling like \"walking on stilts\" or leg numbness on 750 mg daily x 14 days. He stopped therapy on day 11 and woke up feeling fine the next day after stopping. Per Celso, consider using 500 mg dose next time and monitor closely for side effects.     Linezolid Other (See Comments)     Peripheral neuropathy       Hospital Course Information:  Risk for Readmission (LACE) No data recorded     Course During Hospital Stay:  He was admitted to the hospital because of his cystic fibrosis and worsening lung function. He was admitted for 4 days. He had a picc line placed and was given IV antibiotics.   PMH, PSH, Care Team, Medications including OTC/CAM and Allergies reviewed and updated as appropriate.     Objective     Physical Exam:  Vital Signs:   Vitals:    06/14/24 0956   BP: 124/70   Pulse: 67   Temp: 97.9 °F (36.6 °C)   SpO2: 96%   Weight: 90.3 kg (199 lb)   Height: 175.3 cm (69.02\")     Body mass index is 29.37 kg/m².     Physical Exam  Vitals reviewed.   Cardiovascular:      Rate and Rhythm: Normal rate and regular rhythm.   Pulmonary:      Effort: Pulmonary effort is normal. No respiratory " distress.      Breath sounds: Wheezing and rhonchi present.   Neurological:      Mental Status: He is alert.         Assessment / Plan      Assessment/Plan:   Diagnoses and all orders for this visit:    1. Hospital discharge follow-up (Primary)    2. Cystic fibrosis of the lung    3. Longstanding persistent atrial fibrillation         Patient is atrial fibs was fairly well-controlled today.  Patient doing well after hospital discharge.  Medication reconciliation was performed.  Patient advised to follow-up with pulmonology as needed.  Will reevaluate anemia at next visit.  Follow-up for wellness exam.      Transitional Care Management Certification  I certify that the following are true:  Communication was made within 2 business days of discharge.  Complexity of Medical Decision Making is moderate.  Face to face visit occurred within 14 days.    Follow Up:   Return for Next scheduled follow up.    Best Watkins DO  [facility]

## 2024-09-11 ENCOUNTER — OFFICE VISIT (OUTPATIENT)
Dept: FAMILY MEDICINE CLINIC | Facility: CLINIC | Age: 79
End: 2024-09-11
Payer: MEDICARE

## 2024-09-11 VITALS
TEMPERATURE: 98.7 F | WEIGHT: 194 LBS | BODY MASS INDEX: 28.73 KG/M2 | SYSTOLIC BLOOD PRESSURE: 118 MMHG | DIASTOLIC BLOOD PRESSURE: 72 MMHG | OXYGEN SATURATION: 97 % | HEIGHT: 69 IN | HEART RATE: 65 BPM

## 2024-09-11 DIAGNOSIS — Z00.00 MEDICARE ANNUAL WELLNESS VISIT, SUBSEQUENT: Primary | ICD-10-CM

## 2024-09-11 PROCEDURE — 3074F SYST BP LT 130 MM HG: CPT | Performed by: FAMILY MEDICINE

## 2024-09-11 PROCEDURE — 1170F FXNL STATUS ASSESSED: CPT | Performed by: FAMILY MEDICINE

## 2024-09-11 PROCEDURE — 1126F AMNT PAIN NOTED NONE PRSNT: CPT | Performed by: FAMILY MEDICINE

## 2024-09-11 PROCEDURE — 1159F MED LIST DOCD IN RCRD: CPT | Performed by: FAMILY MEDICINE

## 2024-09-11 PROCEDURE — 1160F RVW MEDS BY RX/DR IN RCRD: CPT | Performed by: FAMILY MEDICINE

## 2024-09-11 PROCEDURE — 3078F DIAST BP <80 MM HG: CPT | Performed by: FAMILY MEDICINE

## 2024-09-11 PROCEDURE — G0439 PPPS, SUBSEQ VISIT: HCPCS | Performed by: FAMILY MEDICINE

## 2024-09-11 NOTE — PROGRESS NOTES
Subjective   The ABCs of the Annual Wellness Visit  Medicare Wellness Visit      Ramírez Hinds is a 78 y.o. patient who presents for a Medicare Wellness Visit.    The following portions of the patient's history were reviewed and   updated as appropriate: allergies, current medications, past family history, past medical history, past social history, past surgical history, and problem list.    Compared to one year ago, the patient's physical   health is worse.  Compared to one year ago, the patient's mental   health is the same.    Recent Hospitalizations:  He was admitted within the past 365 days at [facility].     Current Medical Providers:  Patient Care Team:  Best Watkins DO as PCP - General (Family Medicine)  evonne sotero hinds (Dental General Practice)  Galileo Domínguez MD as Consulting Physician (Pulmonary Disease)  Venancio Perry MD as Consulting Physician (Urology)  Susie Fernandez OD (Optometry)    Outpatient Medications Prior to Visit   Medication Sig Dispense Refill    acetaminophen (TYLENOL) 325 MG tablet Take 2 tablets by mouth Every 6 (Six) Hours As Needed for Mild Pain.      albuterol (PROVENTIL HFA;VENTOLIN HFA) 108 (90 BASE) MCG/ACT inhaler ProAir  (90 Base) MCG/ACT Inhalation Aerosol Solution; Patient Sig: ProAir  (90 Base) MCG/ACT Inhalation Aerosol Solution INHALE 2 PUFFS FOUR TIMES A DAY FOR COUGH; 1; 11; 02-Jun-2015; Active      albuterol (PROVENTIL) (2.5 MG/3ML) 0.083% nebulizer solution USE 1 VIAL VIA NEBULIZATION EVERY 4 HOURS AS NEEDED FOR WHEEZING OR SHORTNESS OF BREATH 360 mL 0    Aztreonam Lysine 75 MG reconstituted solution Inhale 75 mg 3 (Three) Times a Day.      b complex-C-folic acid 1 MG capsule Take 1 capsule by mouth Daily.      Calcium Carb-Cholecalciferol (OS-CAROLE CALCIUM + D3) 500-200 MG-UNIT tablet Take 2 tablets by mouth Daily. 60 tablet 11    cyanocobalamin (VITAMIN B-12) 1000 MCG tablet Take 1 tablet by mouth Daily.      dilTIAZem CD  (CARDIZEM CD) 360 MG 24 hr capsule       dornase alpha (PULMOZYME) 2.5 MG/2.5ML nebulizer solution Inhale 2.5 mg Daily.      Zqngkxcw-Ukbvmdf-Trgqjv&Ivacaf (Trikafta) 100-50-75 & 150 MG tablet therapy pack Take  by mouth.      fluticasone (FLONASE) 50 MCG/ACT nasal spray 2 sprays into the nostril(s) as directed by provider Daily.      gabapentin (NEURONTIN) 100 MG capsule TAKE 2 CAPSULES BY MOUTH IN THE AFTERNOON AND 1 IN ADDITION  MG CAPSULE AT BEDTIME      gabapentin (NEURONTIN) 300 MG capsule       guaiFENesin (MUCINEX) 600 MG 12 hr tablet Take 1 tablet by mouth.      hydroCHLOROthiazide (HYDRODIURIL) 50 MG tablet Take 1 tablet by mouth Daily.      ipratropium (ATROVENT) 0.03 % nasal spray 2 sprays into the nostril(s) as directed by provider.      ketoconazole (NIZORAL) 2 % cream APPLY TO THE AFFECTED AREA(S) BY TOPICAL ROUTE TWICE DAILY      ketoconazole (NIZORAL) 2 % shampoo APPLY TO THE AFFECTED AREA(S), LATHER, LEAVE IN PLACE FOR 5 MINUTES, AND THEN RINSE OFF WITH WATER BY TOPICAL ROUTE THREE TIMES PER WEEK.      Melatonin 1 MG capsule Take 1 mg by mouth.      multivitamin with minerals tablet tablet Take 1 tablet by mouth Daily.      omeprazole (priLOSEC) 40 MG capsule TAKE 1 CAPSULE BY MOUTH EVERY DAY 90 capsule 0    rivaroxaban (XARELTO) 20 MG tablet Take 1 tablet by mouth Daily With Dinner.      rOPINIRole (REQUIP) 1 MG tablet Take 1 tablet by mouth. Take 2 in afternoon and 3 at night      sodium chloride 3 % nebulizer solution Inhale 4 mL Daily.      Darrel Podhaler 28 MG capsule       valsartan (DIOVAN) 320 MG tablet Take 1 tablet by mouth Daily.       No facility-administered medications prior to visit.     No opioid medication identified on active medication list. I have reviewed chart for other potential  high risk medication/s and harmful drug interactions in the elderly.      Aspirin is not on active medication list.  Aspirin use is not indicated based on review of current medical condition/s.  "Risk of harm outweighs potential benefits.  .    Patient Active Problem List   Diagnosis    Bronchiectasis    Anxiety    BPH without urinary obstruction    GERD (gastroesophageal reflux disease)    Hypertension    Mycobacterium avium complex    Cystic fibrosis of the lung    Pseudomonas aeruginosa colonization    Pulmonary nodule    Neuropathy    Pseudomonas aeruginosa infection    Age-related osteoporosis without current pathological fracture    Biallelic mutation of CFTR gene    Multiple benign melanocytic nevi    Osteopenia    Restless leg syndrome    Seborrheic keratosis    Senile angioma    Solar lentigo    Peptic ulcer     Advance Care Planning Advance Directive is not on file.  ACP discussion was held with the patient during this visit. Patient has an advance directive (not in EMR), copy requested.            Objective   Vitals:    24 1515   BP: 118/72   Pulse: 65   Temp: 98.7 °F (37.1 °C)   SpO2: 97%   Weight: 88 kg (194 lb)   Height: 175.3 cm (69.02\")       Estimated body mass index is 28.63 kg/m² as calculated from the following:    Height as of this encounter: 175.3 cm (69.02\").    Weight as of this encounter: 88 kg (194 lb).            Does the patient have evidence of cognitive impairment? No                                                                                              Health  Risk Assessment    Smoking Status:  Social History     Tobacco Use   Smoking Status Former    Current packs/day: 0.00    Average packs/day: 0.5 packs/day for 4.0 years (2.0 ttl pk-yrs)    Types: Cigarettes    Start date:     Quit date: 1968    Years since quittin.7   Smokeless Tobacco Never   Tobacco Comments    Smoked briefly in college.     Alcohol Consumption:  Social History     Substance and Sexual Activity   Alcohol Use Yes    Comment: Occasional/Social use       Fall Risk Screen  STEADI Fall Risk Assessment was completed, and patient is at LOW risk for falls.Assessment completed " on:2024    Depression Screenin/11/2024     3:28 PM   PHQ-2/PHQ-9 Depression Screening   Little Interest or Pleasure in Doing Things 0-->not at all   Feeling Down, Depressed or Hopeless 0-->not at all   PHQ-9: Brief Depression Severity Measure Score 0     Health Habits and Functional and Cognitive Screenin/11/2024     3:25 PM   Functional & Cognitive Status   Do you have difficulty preparing food and eating? No   Do you have difficulty bathing yourself, getting dressed or grooming yourself? No   Do you have difficulty using the toilet? No   Do you have difficulty moving around from place to place? No   Do you have trouble with steps or getting out of a bed or a chair? No   Current Diet Well Balanced Diet   Dental Exam Up to date   Eye Exam Up to date   Exercise (times per week) 6 times per week   Current Exercises Include Light Weights;Other;Home Exercise Program (TV, Computer, Etc.)   Do you need help using the phone?  No   Are you deaf or do you have serious difficulty hearing?  No   Do you need help to go to places out of walking distance? No   Do you need help shopping? No   Do you need help preparing meals?  No   Do you need help with housework?  No   Do you need help with laundry? No   Do you need help taking your medications? No   Do you need help managing money? No   Do you ever drive or ride in a car without wearing a seat belt? No   Have you felt unusual stress, anger or loneliness in the last month? No   Who do you live with? Spouse   If you need help, do you have trouble finding someone available to you? No   Have you been bothered in the last four weeks by sexual problems? No   Do you have difficulty concentrating, remembering or making decisions? No           Age-appropriate Screening Schedule:  Refer to the list below for future screening recommendations based on patient's age, sex and/or medical conditions. Orders for these recommended tests are listed in the plan section. The  patient has been provided with a written plan.    Health Maintenance List  Health Maintenance   Topic Date Due    LIPID PANEL  01/09/2024    INFLUENZA VACCINE  03/31/2025 (Originally 8/1/2024)    COVID-19 Vaccine (3 - 2023-24 season) 09/11/2025 (Originally 9/1/2024)    BMI FOLLOWUP  06/14/2025    DXA SCAN  07/12/2025    ANNUAL WELLNESS VISIT  09/11/2025    TDAP/TD VACCINES (2 - Td or Tdap) 12/20/2028    COLORECTAL CANCER SCREENING  03/07/2029    HEPATITIS C SCREENING  Completed    RSV Vaccine - Adults  Completed    Pneumococcal Vaccine 65+  Completed    ZOSTER VACCINE  Completed                                                                                                                                                CMS Preventative Services Quick Reference  Risk Factors Identified During Encounter  Immunizations Discussed/Encouraged: Influenza  Inactivity/Sedentary: Patient was advised to exercise at least 150 minutes a week per CDC recommendations.    The above risks/problems have been discussed with the patient.  Pertinent information has been shared with the patient in the After Visit Summary.  An After Visit Summary and PPPS were made available to the patient.    Follow Up:  Next Medicare Wellness visit to be scheduled in 1 year.     Assessment & Plan  Medicare annual wellness visit, subsequent               Follow Up:  Return in about 1 year (around 9/11/2025) for Medicare Wellness.

## 2024-12-19 ENCOUNTER — READMISSION MANAGEMENT (OUTPATIENT)
Dept: CALL CENTER | Facility: HOSPITAL | Age: 79
End: 2024-12-19
Payer: MEDICARE

## 2024-12-20 ENCOUNTER — TRANSITIONAL CARE MANAGEMENT TELEPHONE ENCOUNTER (OUTPATIENT)
Dept: CALL CENTER | Facility: HOSPITAL | Age: 79
End: 2024-12-20
Payer: MEDICARE

## 2024-12-20 NOTE — OUTREACH NOTE
Prep Survey      Flowsheet Row Responses   [facility] facility patient discharged from? Non-BH   Is LACE score < 7 ? Non-BH Discharge   Eligibility [facility]   Date of Admission 12/09/24   Date of Discharge 12/19/24   Discharge Disposition Home or Self Care   Discharge diagnosis Cystic fibrosis   Does the patient have one of the following disease processes/diagnoses(primary or secondary)? Other   Does the patient have Home health ordered? No   Prep survey completed? Yes            Ingrid Heller Registered Nurse

## 2024-12-20 NOTE — OUTREACH NOTE
Call Center TCM Note      Flowsheet Row Responses   [facility] patient discharged from? Non-  [[facility]]   Does the patient have one of the following disease processes/diagnoses(primary or secondary)? Other   TCM attempt successful? Yes   Call start time 1544   Call end time 1549   Discharge diagnosis Cystic fibrosis exacerbation   Person spoke with today (if not patient) and relationship Patient   Meds reviewed with patient/caregiver? Yes   Does the patient have all medications ordered at discharge? Yes   Is the patient taking all medications as directed (includes completed medication regime)? Yes   Comments PCP DR Watkins. Patient did not want to be scheduled with any other provider besides DR Watkins. He wanted Dr Watkins first available Hospital follow up. Patient scheduled for 1/7/24  945 with Dr Watkins. Message routed to office.   Does the patient have an appointment with their PCP within 7-14 days of discharge? No   Nursing Interventions Assisted patient with making appointment per protocol, Routed TCM call to PCP office   Has home health visited the patient within 72 hours of discharge? N/A   DME comments Patient reports that he has home O22L that he wears at night and with exertion. He also has a home nebulizer machine   Psychosocial issues? No   Did the patient receive a copy of their discharge instructions? Yes   Nursing interventions Reviewed instructions with patient   What is the patient's perception of their health status since discharge? Improving   Is the patient/caregiver able to teach back signs and symptoms related to disease process for when to call PCP? Yes   Is the patient/caregiver able to teach back signs and symptoms related to disease process for when to call 911? Yes   Is the patient/caregiver able to teach back the hierarchy of who to call/visit for symptoms/problems? PCP, Specialist, Home health nurse, Urgent Care, ED, 911 Yes   If the patient is a current smoker, are they  able to teach back resources for cessation? Not a smoker   TCM call completed? Yes   Call end time [phone]   Would this patient benefit from a Referral to [facility] Social Work? No   Is the patient interested in additional calls from an ambulatory ? No            Shanon Martin RN    12/20/2024, 15:53 EST

## (undated) DEVICE — CATH IV INSYTE AUTOGARD SHLD 24G .56IN

## (undated) DEVICE — CATH IV ANGIOCATH/AUTOGARD 20G 1IN PNK

## (undated) DEVICE — TBG 02 CRUSH RESIST LF CLR 7FT

## (undated) DEVICE — ELECTRODE,ECG,FOAM, 3/PK: Brand: MEDLINE

## (undated) DEVICE — APPL COTN TP PLSTC 6IN STRL LF PK/2

## (undated) DEVICE — SPEC CONT WIDE MOUTH 3OZ 400/CS 20 CS/SK: Brand: MEDEGEN MEDICAL PRODUCTS, LLC

## (undated) DEVICE — CATH IV ANGIOCATH/AUTOGARD 22G 1IN BLU

## (undated) DEVICE — SINGLE USE BIOPSY VALVE MAJ-210: Brand: SINGLE USE BIOPSY VALVE (STERILE)

## (undated) DEVICE — SOL NACL 0.9PCT 1000ML

## (undated) DEVICE — SYR SLP TP 10ML DISP

## (undated) DEVICE — BOWL UTIL STRL 32OZ

## (undated) DEVICE — CATH IV ANGIOCATH/AUTOGARD 18G 1.25IN GR

## (undated) DEVICE — SENSR O2 OXIMAX FNGR A/ 18IN NONSTR

## (undated) DEVICE — TRAP,MUCUS SPECIMEN,40CC: Brand: MEDLINE

## (undated) DEVICE — ST INF 10DRP 4 PORT 4WY/STPCOCK 112IN

## (undated) DEVICE — SYR SLPTP 20CC

## (undated) DEVICE — CANNULA,ADULT,SOFT-TOUCH,7'TUBE,UC: Brand: PENDING

## (undated) DEVICE — SINGLE USE SUCTION VALVE MAJ-209: Brand: SINGLE USE SUCTION VALVE (STERILE)

## (undated) DEVICE — NEB SETUP SD STREAM W TBG

## (undated) DEVICE — CONN STD FOR/O2 TBG

## (undated) DEVICE — SOL IRR NACL 0.9PCT BT 1000ML

## (undated) DEVICE — THORACENTESIS TRAY WITH SAFETY COMPONENTS: Brand: CURITY

## (undated) DEVICE — SOL LR 1000ML